# Patient Record
Sex: FEMALE | Race: BLACK OR AFRICAN AMERICAN | NOT HISPANIC OR LATINO | URBAN - METROPOLITAN AREA
[De-identification: names, ages, dates, MRNs, and addresses within clinical notes are randomized per-mention and may not be internally consistent; named-entity substitution may affect disease eponyms.]

---

## 2017-05-17 ENCOUNTER — TELEPHONE (OUTPATIENT)
Dept: FAMILY MEDICINE | Facility: CLINIC | Age: 32
End: 2017-05-17

## 2017-05-17 ENCOUNTER — OFFICE VISIT (OUTPATIENT)
Dept: FAMILY MEDICINE | Facility: CLINIC | Age: 32
End: 2017-05-17

## 2017-05-17 VITALS
RESPIRATION RATE: 18 BRPM | BODY MASS INDEX: 22.46 KG/M2 | HEART RATE: 74 BPM | SYSTOLIC BLOOD PRESSURE: 98 MMHG | WEIGHT: 115 LBS | TEMPERATURE: 98.8 F | OXYGEN SATURATION: 97 % | DIASTOLIC BLOOD PRESSURE: 66 MMHG

## 2017-05-17 DIAGNOSIS — Z32.01 PREGNANCY TEST POSITIVE: Primary | ICD-10-CM

## 2017-05-17 LAB — HCG UR QL: POSITIVE

## 2017-05-17 ASSESSMENT — ENCOUNTER SYMPTOMS
NAUSEA: 0
FEVER: 0
VOMITING: 0
CHILLS: 0
DYSURIA: 0
SHORTNESS OF BREATH: 0
COUGH: 0
DIFFICULTY URINATING: 0
DIARRHEA: 0

## 2017-05-17 NOTE — PROGRESS NOTES
Preceptor Attestation:   Patient seen and discussed with the resident.   Assessment and plan reviewed with resident and agreed upon.   Supervising Physician:  Seven Bell MD  [facility]

## 2017-05-17 NOTE — MR AVS SNAPSHOT
After Visit Summary   2017    Uday Stauffer    MRN: [id number]           Patient Information     Date Of Birth          1985        Visit Information        Provider Department      2017 4:00 PM Seven Bell MD [facility]        Today's Diagnoses     Pregnancy test positive    -  1       Follow-ups after your visit        Who to contact     Please call your clinic at [phone] to:    Ask questions about your health    Make or cancel appointments    Discuss your medicines    Learn about your test results    Speak to your doctor   If you have compliments or concerns about an experience at your clinic, or if you wish to file a complaint, please contact [facility] Patient Relations at [phone] or email us at [email or web address]         Additional Information About Your Visit        MyChart Information     [employer] is an electronic gateway that provides easy, online access to your medical records. With [employer], you can request a clinic appointment, read your test results, renew a prescription or communicate with your care team.     To sign up for One Diaryt visit the website at [email or web address]   You will be asked to enter the access code listed below, as well as some personal information. Please follow the directions to create your username and password.     Your access code is: [email or web address]  Expires: 8/15/2017  4:51 PM     Your access code will  in 90 days. If you need help or a new code, please contact your [facility] or call [phone] for assistance.        Care EveryWhere ID     This is your Care EveryWhere ID. This could be used by other organizations to access your [place] medical records  [id number]        Your Vitals Were     Pulse Temperature Respirations Last Period Pulse Oximetry Breastfeeding?    74 98.8  F (37.1  C) (Oral) 18 2017 97% No    BMI (Body  Mass Index)                   22.46 kg/m2            Blood Pressure from Last 3 Encounters:   05/17/17 98/66   11/25/16 96/65   10/27/16 128/83    Weight from Last 3 Encounters:   05/17/17 115 lb (52.2 kg)   11/25/16 117 lb (53.1 kg)   10/27/16 111 lb (50.3 kg)              We Performed the Following     HCG Qualitative Urine (UPT) ([facility])          Today's Medication Changes          These changes are accurate as of: 5/17/17  4:51 PM.  If you have any questions, ask your nurse or doctor.               Stop taking these medicines if you haven't already. Please contact your care team if you have questions.     ibuprofen 800 MG tablet   Commonly known as:  ADVIL/MOTRIN   Stopped by:  Seven Bell MD           levonorgestrel 1.5 MG Tabs tablet   Commonly known as:  PLAN B   Stopped by:  Seven Bell MD                    Primary Care Provider Office Phone # Fax #    Ziyad Morton,  [phone]       [facility] [address] [facility] [address]        Thank you!     Thank you for choosing [facility]  for your care. Our goal is always to provide you with excellent care. Hearing back from our patients is one way we can continue to improve our services. Please take a few minutes to complete the written survey that you may receive in the mail after your visit with us. Thank you!             Your Updated Medication List - Protect others around you: Learn how to safely use, store and throw away your medicines at www.disposemymeds.org.          This list is accurate as of: 5/17/17  4:51 PM.  Always use your most recent med list.                   Brand Name Dispense Instructions for use    cetirizine 10 MG tablet    zyrTEC    30 tablet    Take 1 tablet (10 mg) by mouth every evening       eucerin cream     200 g    Apply topically as needed for dry skin       lanolin ointment     28 g    Apply topically daily as needed for dry skin or itchy skin        polyethylene glycol powder    MIRALAX    510 g    Take 17 g (1 capful) by mouth daily       triamcinolone 0.1 % ointment    KENALOG    45 g    Apply to affected areas of rash on neck and back twice daily. Use as instructed.

## 2017-05-17 NOTE — TELEPHONE ENCOUNTER
[facility] phone call message: OB Request    Patient calling to start OB care. Has pregnancy been confirmed at a healthcare facility? No, patient has not taken a pregnancy test. Patient scheduled for confirmation of pregnancy visit in clinic.

## 2017-05-17 NOTE — PROGRESS NOTES
HPI:       Uday Stauffer is a 32 year old who presents for the following  Patient presents with:  Pregnancy Test    She states that she normally gets her period on time, and she was a week late. On the 30th she had some spotting. last menstrual period was April 4th, 2017.  She has 1 child, and he is 13 years old.     Problem, Medication and Allergy Lists were reviewed and are current.  Patient is an established patient of this clinic.         Review of Systems:   Review of Systems   Constitutional: Negative for chills and fever.   Eyes: Negative for visual disturbance.   Respiratory: Negative for cough and shortness of breath.    Cardiovascular: Negative for chest pain.   Gastrointestinal: Negative for diarrhea, nausea and vomiting.   Genitourinary: Negative for difficulty urinating, dysuria and vaginal discharge.        Missed period              Physical Exam:   Patient Vitals for the past 24 hrs:   BP Temp Temp src Pulse Resp SpO2 Weight   05/17/17 1616 98/66 98.8  F (37.1  C) Oral 74 18 97 % 115 lb (52.2 kg)     Body mass index is 22.46 kg/(m^2).  Vitals were reviewed and were normal     Physical Exam   Constitutional: She is oriented to person, place, and time. She appears well-developed and well-nourished. No distress.   HENT:   Head: Normocephalic and atraumatic.   Right Ear: External ear normal.   Left Ear: External ear normal.   Eyes: Conjunctivae are normal.   Neck: Normal range of motion.   Abdominal: Soft. She exhibits no distension. There is no tenderness. There is no rebound and no guarding.   Neurological: She is alert and oriented to person, place, and time. Gait normal.   Skin: Skin is warm. No rash noted. She is not diaphoretic.   Psychiatric: She has a normal mood and affect. Her behavior is normal. Thought content normal.         Results:     Results for orders placed or performed in visit on 05/17/17   HCG Qualitative Urine (UPT) ([facility])   Result Value Ref Range    HCG Qual Urine  POSITIVE Negative     Assessment and Plan    at 6weeks based on last menstrual period who presents for confirmation of pregnancy after a missed period. UPT positive today, patient is planning on keeping pregnancy but unsure if she will continue her prenatal care at [facility]. She will call back to schedule an appointment. Recommended prenatal vitamins which she prefers to get over the counter. Advised to avoid taking OTC medications without discussing with a provider, tylenol ok for pain or fever.   Diagnoses and all orders for this visit:    Pregnancy test positive  -     HCG Qualitative Urine (UPT) ([facility])          Medications Discontinued During This Encounter   Medication Reason     levonorgestrel (PLAN B) 1.5 MG TABS      ibuprofen (ADVIL,MOTRIN) 800 MG tablet      Options for treatment and follow-up care were reviewed with the patient. Uday Stauffer  engaged in the decision making process and verbalized understanding of the options discussed and agreed with the final plan.    Seven Bell MD

## 2017-10-09 ENCOUNTER — OFFICE VISIT (OUTPATIENT)
Dept: FAMILY MEDICINE | Facility: CLINIC | Age: 32
End: 2017-10-09

## 2017-10-09 VITALS
DIASTOLIC BLOOD PRESSURE: 75 MMHG | OXYGEN SATURATION: 100 % | BODY MASS INDEX: 22.26 KG/M2 | TEMPERATURE: 98.3 F | RESPIRATION RATE: 16 BRPM | HEART RATE: 63 BPM | SYSTOLIC BLOOD PRESSURE: 111 MMHG | WEIGHT: 114 LBS

## 2017-10-09 DIAGNOSIS — L30.9 DERMATITIS: ICD-10-CM

## 2017-10-09 DIAGNOSIS — Z32.00 PREGNANCY EXAMINATION OR TEST, PREGNANCY UNCONFIRMED: Primary | ICD-10-CM

## 2017-10-09 LAB — HCG UR QL: POSITIVE

## 2017-10-09 RX ORDER — TRIAMCINOLONE ACETONIDE 1 MG/G
OINTMENT TOPICAL
Qty: 45 G | Refills: 3 | Status: SHIPPED | OUTPATIENT
Start: 2017-10-09 | End: 2018-02-01

## 2017-10-09 ASSESSMENT — ENCOUNTER SYMPTOMS
DIFFICULTY URINATING: 0
NEUROLOGICAL NEGATIVE: 1
BLOOD IN STOOL: 0
HEMATOLOGIC/LYMPHATIC NEGATIVE: 1
TROUBLE SWALLOWING: 0
NAUSEA: 0
FEVER: 0
SHORTNESS OF BREATH: 0
CHILLS: 0
VOMITING: 0
DIARRHEA: 0
RHINORRHEA: 0
ABDOMINAL PAIN: 1
CONSTIPATION: 0

## 2017-10-09 NOTE — PROGRESS NOTES
Preceptor Attestation:   Patient seen and discussed with the resident. Assessment and plan reviewed with resident and agreed upon.   Supervising Physician:  Job Patiño MD  [facility]

## 2017-10-09 NOTE — PROGRESS NOTES
HPI:       Uday Stauffer is a 32 year old who presents for the following:    Pregnancy test: Urine HCG test confirms home pregnancy test as positive. The pregnancy was unplanned but not bad news. She would like to discuss the situation with the father. She believes LMP was first week of September, putting the pregnancy at ~4wks. She describes 3 previous pregnancies, however previous note says  (now ). Of those she remembers, pregnancy #1 led to spontaneous  delivery of now 13-year-old son, #2 led to ~9wk miscarriage this spring, and #3 is current. She denies medical problems during her pregnancies, current medical problems, or current prescription medications. Family history is positive for hypertension and diabetes mellitus.    Shoulder: describes right shoulder pain for two weeks after a lifting injury at work. She did not fall, just felt her shoulder give a little with pain. Pain limits her active ROM, however there is no muscular weakness. Pain is referred along the lateral arm with motion. She has taken Excedrin   Migraine and possibly ibuprofen for this and other pains, which is a concern during pregnancy.    Problem, Medication and Allergy Lists were reviewed and are current.  Patient is an established patient of this clinic.         Review of Systems:   Review of Systems   Constitutional: Negative for chills and fever.   HENT: Negative for ear pain, rhinorrhea and trouble swallowing.    Eyes: Negative for visual disturbance.   Respiratory: Negative for shortness of breath.    Cardiovascular: Negative for chest pain.   Gastrointestinal: Positive for abdominal pain. Negative for blood in stool, constipation, diarrhea, nausea and vomiting.   Genitourinary: Negative for difficulty urinating.   Skin: Negative.    Neurological: Negative.    Hematological: Negative.              Physical Exam:   Patient Vitals for the past 24 hrs:   BP Temp Temp src Pulse Resp SpO2 Weight   10/09/17 1619  111/75 98.3  F (36.8  C) Oral 63 16 100 % 114 lb (51.7 kg)     Body mass index is 22.26 kg/(m^2).  Vitals were reviewed and were normal     Physical Exam   Constitutional: She is oriented to person, place, and time. She appears well-developed and well-nourished. No distress.   HENT:   Head: Normocephalic and atraumatic.   Eyes: EOM are normal. Right eye exhibits no discharge. Left eye exhibits no discharge.   Neck: Normal range of motion. Neck supple.   Cardiovascular: Normal rate and regular rhythm.    No murmur heard.  Pulmonary/Chest: Effort normal. No respiratory distress. She has no wheezes. She has no rales.   Abdominal: Soft. There is no tenderness.   Musculoskeletal: Normal range of motion. She exhibits no edema.   Neurological: She is alert and oriented to person, place, and time. Coordination normal.   Skin: Skin is warm and dry. No rash noted. She is not diaphoretic.   Psychiatric: She has a normal mood and affect. Her behavior is normal.     MSK: no visible shoulder deformity, not tender to palpation   Passive ROM intact   Active ROM limited laterally by pain above shoulder height, otherwise intact   Both thumbs up back to mid-scapula equally   No weakness on int-/external rotation, abd-/adduction, flex-/extension   Empty can test negative      Results:      Results from the last 24 hours  Results for orders placed or performed in visit on 10/09/17 (from the past 24 hour(s))   HCG Qualitative Urine (UPT) ([facility])   Result Value Ref Range    HCG Qual Urine POSITIVE Negative     Assessment and Plan     # pregnancy, approx 4wks gestation  Unplanned, will discuss options with father.  Discussed options for pregnancy including continuation, adoption, .  She was non-committal regarding what she was considering, stated she wanted to discuss with partner and discuss further with her PCP Dr. Morton who she has good rapport with.   - follow up w/ PCP Dr. Morton   - scheduled dating ultrasound for after  8wks (november)   - counseled to start prenatal vitamin (declined prescription)   - counseled to avoid aspirin and ibuprofen    # right shoulder pain   - discussed symptomatic management with ice/heat/rest     There are no discontinued medications.  Options for treatment and follow-up care were reviewed with the patient. Uday Stauffer  engaged in the decision making process and verbalized understanding of the options discussed and agreed with the final plan.    This note scribed by Bruce Hartman MS4 for Alonso Mckee MD    The medical student acted as scribe and the encounter documented above was completely performed by myself and the documentation reflects the work I have performed today. Alonso Mckee MD

## 2017-10-09 NOTE — PATIENT INSTRUCTIONS
Here is the plan from today's visit    1. Pregnancy examination or test, pregnancy unconfirmed  - HCG Qualitative Urine (UPT) ([facility])  -  OB <14 WKS SINGLE OR FIRST GESTATION (IN CLINIC); Future: After 11/8/17  - Recommend daily prenatal vitamin    2. Dermatitis  - triamcinolone (KENALOG) 0.1 % ointment; Apply to affected areas of rash on neck and back twice daily. Use as instructed.  Dispense: 45 g; Refill: 3    Please call or return to clinic if your symptoms don't go away.    Follow up plan  Please make a clinic appointment for follow up with your primary physician Ziyad Morton after dating ultrasound for first OB.    Thank you for coming to [facility] today.  Lab Testing:  **If you had lab testing today and your results are reassuring or normal they will be mailed to you or sent through Limundo within 7 days.   **If the lab tests need quick action we will call you with the results.  The phone number we will call with results is # [phone] (home) . If this is not the best number please call our clinic and change the number.  Medication Refills:  If you need any refills please call your pharmacy and they will contact us.   If you need to  your refill at a new pharmacy, please contact the new pharmacy directly. The new pharmacy will help you get your medications transferred faster.   Scheduling:  If you have any concerns about today's visit or wish to schedule another appointment please call our office during normal business hours [phone] (8-5:00 M-F)  If a referral was made to a [facility] and you don't get a call from central scheduling please call [phone].  If a Mammogram was ordered for you at [facility] call [phone] to schedule or change your appointment.  If you had an XRay/CT/Ultrasound/MRI ordered the number is [phone] to schedule or change your radiology appointment.   Medical Concerns:  If you have urgent medical concerns  please call [phone] at any time of the day.

## 2017-10-09 NOTE — MR AVS SNAPSHOT
After Visit Summary   10/9/2017    Uday Stauffer    MRN: [id number]           Patient Information     Date Of Birth          1985        Visit Information        Provider Department      10/9/2017 4:20 PM Alonso Mckee MD [facility]        Today's Diagnoses     Pregnancy examination or test, pregnancy unconfirmed    -  1    Dermatitis          Care Instructions    Here is the plan from today's visit    1. Pregnancy examination or test, pregnancy unconfirmed  - HCG Qualitative Urine (UPT) ([facility])  -  OB <14 WKS SINGLE OR FIRST GESTATION (IN CLINIC); Future: After 11/8/17  - Recommend daily prenatal vitamin    2. Dermatitis  - triamcinolone (KENALOG) 0.1 % ointment; Apply to affected areas of rash on neck and back twice daily. Use as instructed.  Dispense: 45 g; Refill: 3    Please call or return to clinic if your symptoms don't go away.    Follow up plan  Please make a clinic appointment for follow up with your primary physician Ziyad Morton after dating ultrasound for first OB.    Thank you for coming to [facility] today.  Lab Testing:  **If you had lab testing today and your results are reassuring or normal they will be mailed to you or sent through Admatic within 7 days.   **If the lab tests need quick action we will call you with the results.  The phone number we will call with results is # [phone] (home) . If this is not the best number please call our clinic and change the number.  Medication Refills:  If you need any refills please call your pharmacy and they will contact us.   If you need to  your refill at a new pharmacy, please contact the new pharmacy directly. The new pharmacy will help you get your medications transferred faster.   Scheduling:  If you have any concerns about today's visit or wish to schedule another appointment please call our office during normal business hours [phone] (8-5:00 M-F)  If a referral was  made to a [facility] and you don't get a call from central scheduling please call [phone].  If a Mammogram was ordered for you at [facility] call [phone] to schedule or change your appointment.  If you had an XRay/CT/Ultrasound/MRI ordered the number is [phone] to schedule or change your radiology appointment.   Medical Concerns:  If you have urgent medical concerns please call [phone] at any time of the day.            Follow-ups after your visit        Future tests that were ordered for you today     Open Future Orders        Priority Expected Expires Ordered    US OB <14 WKS SINGLE OR FIRST GESTATION (IN CLINIC) Routine  10/9/2018 10/9/2017            Who to contact     Please call your clinic at [phone] to:    Ask questions about your health    Make or cancel appointments    Discuss your medicines    Learn about your test results    Speak to your doctor   If you have compliments or concerns about an experience at your clinic, or if you wish to file a complaint, please contact [facility] Patient Relations at [phone] or email us at [email or web address]         Additional Information About Your Visit        [employer] On[employer]and[employer] Information     [employer] is an electronic gateway that provides easy, online access to your medical records. With [employer], you can request a clinic appointment, read your test results, renew a prescription or communicate with your care team.     To sign up for Hurray!t visit the website at [email or web address]   You will be asked to enter the access code listed below, as well as some personal information. Please follow the directions to create your username and password.     Your access code is: [email or web address]  Expires: 2018  4:56 PM     Your access code will  in 90 days. If you need help or a new code, please contact your [facility] or call [phone] for  assistance.        Care EveryWhere ID     This is your Care EveryWhere ID. This could be used by other organizations to access your [place] medical records  [id number]        Your Vitals Were     Pulse Temperature Respirations Last Period Pulse Oximetry BMI (Body Mass Index)    63 98.3  F (36.8  C) (Oral) 16 09/04/2017 100% 22.26 kg/m2       Blood Pressure from Last 3 Encounters:   10/09/17 111/75   05/17/17 98/66   11/25/16 96/65    Weight from Last 3 Encounters:   10/09/17 114 lb (51.7 kg)   05/17/17 115 lb (52.2 kg)   11/25/16 117 lb (53.1 kg)              We Performed the Following     HCG Qualitative Urine (UPT) ([facility])          Where to get your medicines      These medications were sent to [facility] [address] [place], [place] [address] [address] [facility] & [place] [id number], [facility] [address]     Phone:  [phone]     triamcinolone 0.1 % ointment          Primary Care Provider Office Phone # Fax #    Ziyad Morton,  [phone]       [facility] [address] [facility] [address]        Equal Access to Services     TRACY VIRAMONTES AH: Hadii bruno stevens hadasho Soomaali, waaxda luqadaha, qaybta kaalmada adeegyada, luis angel ramires hayraya mirza. So [facility] [phone].    ATENCIÓN: Si habla español, tiene a he disposición servicios gratuitos de asistencia lingüística. Charline al [phone].    We comply with applicable federal civil rights laws and [place] laws. We do not discriminate on the basis of race, color, national origin, age, disability, sex, sexual orientation, or gender identity.            Thank you!     Thank you for choosing [facility]  for your care. Our goal is always to provide you with excellent care. Hearing back from our patients is one way we can continue to improve our services. Please take a few minutes to complete the written survey that you may receive in the mail  after your visit with us. Thank you!             Your Updated Medication List - Protect others around you: Learn how to safely use, store and throw away your medicines at www.disposemymeds.org.          This list is accurate as of: 10/9/17  4:56 PM.  Always use your most recent med list.                   Brand Name Dispense Instructions for use Diagnosis    cetirizine 10 MG tablet    zyrTEC    30 tablet    Take 1 tablet (10 mg) by mouth every evening    Seasonal allergies       eucerin cream     200 g    Apply topically as needed for dry skin    Irritant contact dermatitis       lanolin ointment     28 g    Apply topically daily as needed for dry skin or itchy skin    Itchy skin       polyethylene glycol powder    MIRALAX    510 g    Take 17 g (1 capful) by mouth daily    Constipation, unspecified constipation type       triamcinolone 0.1 % ointment    KENALOG    45 g    Apply to affected areas of rash on neck and back twice daily. Use as instructed.    Dermatitis

## 2017-10-11 ENCOUNTER — TELEPHONE (OUTPATIENT)
Dept: FAMILY MEDICINE | Facility: CLINIC | Age: 32
End: 2017-10-11

## 2017-10-11 NOTE — TELEPHONE ENCOUNTER
Received orders for an Ultrasound due after 11/9/17. Schedule is not open that far yet. I called the patient to let them know that and that I would call once the schdule opens up to get her scheduled. We got disconnected before I could say that. I called back and LVM with my name and callback number.     If patient calls back please relay message above. Thanks!

## 2017-10-16 ENCOUNTER — TELEPHONE (OUTPATIENT)
Dept: FAMILY MEDICINE | Facility: CLINIC | Age: 32
End: 2017-10-16

## 2017-10-16 NOTE — LETTER
October 27, 2017    Uday Stauffer  [address] [facility] [address]      Dear: Uday Stauffer    You were recently referred for an Ultrasound and New OB appointment by your primary care provider at [facility].  We have called twice to schedule this appointment, but have been unable to reach you.  If you are interested in receiving this service, please call [facility] at [phone].  We would be happy to schedule this appointment for you.      Thank you.      Sincerely,    Patient Representative    [facility]  Hours:  Monday-Friday 8:00 am - 5:00 pm   Phone Number: [phone]

## 2017-10-16 NOTE — TELEPHONE ENCOUNTER
Confirmation of pregnancy visit: 10/9/17    LMP: 9/4/17 (estimated)  Gestational Age: approx 5-6 weeks  Estimated Date of Delivery: TBD by US    Dating US has been ordered. Please call patient to schedule US after 11/1/17 (8 weeks gestation).     NOB may be scheduled after US is completed. May be scheduled same day, but US must be completed first.    When calling to schedule NOB, please give scheduling preference to the following providers:    Male:  1. Dr. Hayden  2. Dr. Franco    Female:  1. Dr. Morton  2. Dr. Corbin    Other Notes:  None    Please document call and close encounter.    Felicita Duran RN

## 2017-10-26 ENCOUNTER — TELEPHONE (OUTPATIENT)
Dept: FAMILY MEDICINE | Facility: CLINIC | Age: 32
End: 2017-10-26

## 2017-11-01 DIAGNOSIS — Z32.01 PREGNANCY EXAMINATION OR TEST, POSITIVE RESULT: Primary | ICD-10-CM

## 2017-11-01 NOTE — LETTER
2017      Uday Stauffer  [address] [facility] [address]        Dear Uday,    Thank you for getting your care at [facility]. Please see below for your test results.  Discussed in clinic, please schedule follow up OB appointment and ultrasound as instructed previously.    Resulted Orders   US OB < 14 Weeks w Transvaginal (IN CLINIC)    Narrative    1st Trimester Ultrasound Report   CONCLUSIONS: Viable IUP, there is a vascular questionable area seen adjacent to the fetal pole  EGA by this U/S: 7wks4d (+/- 5days): JET by this  U/S: 18              Follow up: Consider follow up US in 4 weeks to re-examine questionable area seen or   Patient:Uday Stauffer :  PCP:Ziyad Morton    Physician/Sonographer: Zoey Nicholas     Indications: Dating   LMP: Patient's last menstrual period was 2017.       Technique: Transabdominal and transvaginal  Machine: [employer] Pro 5    GESTATION & EMBRYO SURVEY:    Location of Pregnancy:Intrauterine fetal pole seen; there is a questionable vascular area seen adjacent to the fetal pole within the amnionic sac.  Cardiac activity: Present at 153 bpm  Yolk Sac: Normal            Rt Ovary : Normal; 2.4x1.7x3.1cm anechoic area seen  Lt Ovary : Normal; 2.9x2.2x2.9cm anechoic area seen with debris? Resolving cyst?    Measurements:                                 CRL:  1.32 cm 7 wks 4d       Zoey Nicholas, [facility] RVTper  Attestation of Reviewer.  I reviewed the images and agree with with interpretation above.  Mat Garcia MD           If you have any concerns about these results please call and leave a message for me or send a Compliance 360t message to the clinic.    Sincerely,    Alonso Mckee MD

## 2017-11-03 NOTE — PROGRESS NOTES
Results for orders placed or performed in visit on 17   US OB < 14 Weeks w Transvaginal (IN CLINIC)    Narrative    1st Trimester Ultrasound Report   CONCLUSIONS: Viable IUP, there is a vascular questionable area seen adjacent to the fetal pole  EGA by this U/S: 7wks4d (+/- 5days): JET by this  U/S: 18              Follow up: Consider follow up US in 4 weeks to re-examine questionable area seen or   Patient:Uday Stauffer :  PCP:Ziyad Morton    Physician/Sonographer: Zoey Nicholas     Indications: Dating   LMP: Patient's last menstrual period was 2017.       Technique: Transabdominal and transvaginal  Machine: [employer] Pro 5    GESTATION & EMBRYO SURVEY:    Location of Pregnancy:Intrauterine fetal pole seen; there is a questionable vascular area seen adjacent to the fetal pole within the amnionic sac.  Cardiac activity: Present at 153 bpm  Yolk Sac: Normal            Rt Ovary : Normal; 2.4x1.7x3.1cm anechoic area seen  Lt Ovary : Normal; 2.9x2.2x2.9cm anechoic area seen with debris? Resolving cyst?    Measurements:                                 CRL:  1.32 cm 7 wks 4d       Zoey Nicholas, [facility] Center[facility]  Attestation of Reviewer.  I reviewed the images and agree with with interpretation above.  Mat Garcia MD

## 2017-11-22 ENCOUNTER — TELEPHONE (OUTPATIENT)
Dept: FAMILY MEDICINE | Facility: CLINIC | Age: 32
End: 2017-11-22

## 2017-11-22 NOTE — TELEPHONE ENCOUNTER
"Patient has no showed 2 scheduled appointments. Please use following script to explain no show policy to patient:    \"We see that you have missed some of your recently scheduled appointments. At [facility] we have a new no show policy, where if you miss too many appointments within 1 year, we may not be able to continue to schedule you. If you are unable to make your scheduled appointments, please call the clinic to cancel prior to your appointment time.\"  "

## 2018-02-01 DIAGNOSIS — L30.9 DERMATITIS: ICD-10-CM

## 2018-02-01 RX ORDER — TRIAMCINOLONE ACETONIDE 1 MG/G
OINTMENT TOPICAL
Qty: 45 G | Refills: 3 | Status: SHIPPED | OUTPATIENT
Start: 2018-02-01 | End: 2021-07-16

## 2018-02-01 NOTE — TELEPHONE ENCOUNTER
Request for medication refill:    Date of last visit at clinic: 10/09/2017    Please complete refill if appropriate and CLOSE ENCOUNTER.    Closing the encounter signifies the refill is complete.    If refill has been denied, please complete the smart phrase .smirefuse and route it to the [facility] RN TRIAGE pool to inform the patient and the pharmacy.    Zohra Medellin, CMA

## 2018-04-16 ENCOUNTER — OFFICE VISIT (OUTPATIENT)
Dept: FAMILY MEDICINE | Facility: CLINIC | Age: 33
End: 2018-04-16
Payer: COMMERCIAL

## 2018-04-16 VITALS
WEIGHT: 124 LBS | OXYGEN SATURATION: 97 % | SYSTOLIC BLOOD PRESSURE: 112 MMHG | TEMPERATURE: 98.2 F | HEART RATE: 66 BPM | RESPIRATION RATE: 16 BRPM | DIASTOLIC BLOOD PRESSURE: 79 MMHG | BODY MASS INDEX: 24.22 KG/M2

## 2018-04-16 DIAGNOSIS — G43.109 MIGRAINE WITH AURA AND WITHOUT STATUS MIGRAINOSUS, NOT INTRACTABLE: ICD-10-CM

## 2018-04-16 DIAGNOSIS — G44.219 EPISODIC TENSION-TYPE HEADACHE, NOT INTRACTABLE: ICD-10-CM

## 2018-04-16 DIAGNOSIS — R10.84 ABDOMINAL PAIN, GENERALIZED: Primary | ICD-10-CM

## 2018-04-16 DIAGNOSIS — N89.8 VAGINAL DISCHARGE: ICD-10-CM

## 2018-04-16 LAB
% GRANULOCYTES: 66.3 %G (ref 40–75)
ALBUMIN SERPL-MCNC: 4.7 MG/DL (ref 3.8–5)
ALP SERPL-CCNC: 106.3 U/L (ref 31.7–110.5)
ALT SERPL-CCNC: 17.3 U/L (ref 0–45)
AST SERPL-CCNC: 9.1 U/L (ref 0–45)
BACTERIA: NORMAL
BILIRUB SERPL-MCNC: <0.4 MG/DL (ref 0.2–1.3)
BUN SERPL-MCNC: 6.8 MG/DL (ref 7–19)
CALCIUM SERPL-MCNC: 8.6 MG/DL (ref 8.5–10.1)
CHLORIDE SERPLBLD-SCNC: 103.4 MMOL/L (ref 98–110)
CLUE CELLS: NORMAL
CO2 SERPL-SCNC: 24.4 MMOL/L (ref 20–32)
CREAT SERPL-MCNC: 0.4 MG/DL (ref 0.5–1)
GFR SERPL CREATININE-BSD FRML MDRD: >90 ML/MIN/1.7 M2
GLUCOSE SERPL-MCNC: 101.1 MG'DL (ref 70–99)
GRANULOCYTES #: 6.5 K/UL (ref 1.6–8.3)
HCT VFR BLD AUTO: 43.9 % (ref 35–47)
HEMOGLOBIN: 13.8 G/DL (ref 11.7–15.7)
LYMPHOCYTES # BLD AUTO: 2.6 K/UL (ref 0.8–5.3)
LYMPHOCYTES NFR BLD AUTO: 26.4 %L (ref 20–48)
MCH RBC QN AUTO: 29.2 PG (ref 26.5–35)
MCHC RBC AUTO-ENTMCNC: 31.4 G/DL (ref 32–36)
MCV RBC AUTO: 92.8 FL (ref 78–100)
MID #: 0.7 K/UL (ref 0–2.2)
MID %: 7.3 %M (ref 0–20)
MOTILE TRICHOMONAS: NEGATIVE
ODOR: NORMAL
PH WET PREP: <4.5
PLATELET # BLD AUTO: 243 K/UL (ref 150–450)
POTASSIUM SERPL-SCNC: 3.3 MMOL/DL (ref 3.3–4.5)
PROT SERPL-MCNC: 6.8 G/DL (ref 6.8–8.8)
RBC # BLD AUTO: 4.73 M/UL (ref 3.8–5.2)
SODIUM SERPL-SCNC: 136.7 MMOL/L (ref 132.6–141.4)
WBC # BLD AUTO: 9.8 K/UL (ref 4–11)
WBC WET PREP: NORMAL
YEAST: NORMAL

## 2018-04-16 RX ORDER — SUMATRIPTAN 50 MG/1
50 TABLET, FILM COATED ORAL
Qty: 18 TABLET | Refills: 3 | Status: SHIPPED | OUTPATIENT
Start: 2018-04-16 | End: 2021-07-16

## 2018-04-16 RX ORDER — ACETAMINOPHEN 325 MG/1
650 TABLET ORAL EVERY 4 HOURS PRN
Qty: 100 TABLET | Refills: 0 | Status: SHIPPED | OUTPATIENT
Start: 2018-04-16 | End: 2020-08-04

## 2018-04-16 NOTE — LETTER
April 20, 2018      Uday Stauffer  [address] [facility] [address]        Dear Uday,    Thank you for getting your care at [facility]. Please see below for your test results.    Resulted Orders   Wet Prep ([facility])   Result Value Ref Range    Yeast Wet Prep None none    Motile Trichomonas Wet Prep Negative Negative    Clue Cells Wet Prep None NONE    WBC WET PREP None 2 - 5    Bacteria Wet Prep Moderate None    pH Wet Prep <4.5 3.8 - 4.5    Odor Wet Prep None NONE    Narrative    Self swab. MR   Anti Treponema   Result Value Ref Range    Treponema pallidum Antibody Negative NEG^Negative   HIV Antigen Antibody Combo   Result Value Ref Range    HIV Antigen Antibody Combo Nonreactive NR^Nonreactive          Comment:      HIV-1 p24 Ag & HIV-1/HIV-2 Ab Not Detected   CBC with Diff Plt (hospitals)   Result Value Ref Range    WBC 9.8 4.0 - 11.0 K/uL    Lymphocytes # 2.6 0.8 - 5.3 K/uL    % Lymphocytes 26.4 20.0 - 48.0 %L    Mid # 0.7 0.0 - 2.2 K/uL    Mid % 7.3 0.0 - 20.0 %M    GRANULOCYTES # 6.5 1.6 - 8.3 K/uL    % Granulocytes 66.3 40.0 - 75.0 %G    RBC 4.73 3.80 - 5.20 M/uL    Hemoglobin 13.8 11.7 - 15.7 g/dL    Hematocrit 43.9 35.0 - 47.0 %    MCV 92.8 78.0 - 100.0 fL    MCH 29.2 26.5 - 35.0 pg    MCHC 31.4 (L) 32.0 - 36.0 g/dL    Platelets 243.0 150.0 - 450.0 K/uL   Comprehensive Metabolic Panel (hospitals)   Result Value Ref Range    Albumin 4.7 3.8 - 5.0 mg/dL    Alkaline Phosphatase 106.3 31.7 - 110.5 U/L    ALT 17.3 0.0 - 45.0 U/L    AST 9.1 0.0 - 45.0 U/L    Bilirubin Total <0.4 0.2 - 1.3 mg/dL    Urea Nitrogen 6.8 (L) 7.0 - 19.0 mg/dL    Calcium 8.6 8.5 - 10.1 mg/dL    Chloride 103.4 98.0 - 110.0 mmol/L    Carbon Dioxide 24.4 20.0 - 32.0 mmol/L    Creatinine 0.4 (L) 0.5 - 1.0 mg/dL    Glucose 101.1 (H) 70.0 - 99.0 mg'dL    Potassium 3.3 3.3 - 4.5 mmol/dL    Sodium 136.7 132.6 - 141.4 mmol/L    Protein Total 6.8 6.8 - 8.8 g/dL    GFR Estimate >90 >60.0 mL/min/1.7 m2    GFR Estimate If Black >90  >60.0 mL/min/1.7 m2   Neisseria gonorrhoeae PCR   Result Value Ref Range    Specimen Descrip Urine     N Gonorrhea PCR Negative NEG^Negative      Comment:      Negative for N. gonorrhoeae rRNA by transcription mediated amplification.  A negative result by transcription mediated amplification does not preclude   the presence of N. gonorrhoeae infection because results are dependent on   proper and adequate collection, absence of inhibitors, and sufficient rRNA to   be detected.     Chlamydia trachomatis PCR   Result Value Ref Range    Specimen Description Urine     Chlamydia Trachomatis PCR Negative NEG^Negative      Comment:      Negative for C. trachomatis rRNA by transcription mediated amplification.  A negative result by transcription mediated amplification does not preclude   the presence of C. trachomatis infection because results are dependent on   proper and adequate collection, absence of inhibitors, and sufficient rRNA to   be detected.         Everything looks normal.  We did see a few bacteria in the vaginal swab but no infection.  No other illness found and your kidneys and liver are normal.  I would see how the next 2 weeks go and if you are not better please come back so we can order an ultrasound and talk about other tests we should do.    Sincerely,    Chet Mckenna MD

## 2018-04-16 NOTE — MR AVS SNAPSHOT
After Visit Summary   2018    Uday Stauffer    MRN: [id number]           Patient Information     Date Of Birth          1985        Visit Information        Provider Department      2018 2:40 PM Chet Mckenna MD [facility]        Today's Diagnoses     Abdominal pain, generalized    -  1    Vaginal discharge        Episodic tension-type headache, not intractable        Migraine with aura and without status migrainosus, not intractable           Follow-ups after your visit        Who to contact     Please call your clinic at [phone] to:    Ask questions about your health    Make or cancel appointments    Discuss your medicines    Learn about your test results    Speak to your doctor            Additional Information About Your Visit        MyChart Information     [employer] is an electronic gateway that provides easy, online access to your medical records. With [employer]Tap, you can request a clinic appointment, read your test results, renew a prescription or communicate with your care team.     To sign up for [employer] visit the website at [email or web address]   You will be asked to enter the access code listed below, as well as some personal information. Please follow the directions to create your username and password.     Your access code is: [email or web address]  Expires: 2018  8:15 AM     Your access code will  in 90 days. If you need help or a new code, please contact your [facility] or call [phone] for assistance.        Care EveryWhere ID     This is your Care EveryWhere ID. This could be used by other organizations to access your [place] medical records  [id number]        Your Vitals Were     Pulse Temperature Respirations Last Period Pulse Oximetry Breastfeeding?    66 98.2  F (36.8  C) (Oral) 16 03/15/2018 (Approximate) 97% No    BMI (Body Mass Index)                   24.22 kg/m2            Blood Pressure from  Last 3 Encounters:   04/16/18 112/79   10/09/17 111/75   05/17/17 98/66    Weight from Last 3 Encounters:   04/16/18 124 lb (56.2 kg)   10/09/17 114 lb (51.7 kg)   05/17/17 115 lb (52.2 kg)              We Performed the Following     Anti Treponema     CBC with Diff Plt ([facility])     Chlamydia trachomatis PCR     Comprehensive Metabolic Panel ([facility])     HIV Antigen Antibody Combo     Neisseria gonorrhoeae PCR     Wet Prep ([facility])          Today's Medication Changes          These changes are accurate as of 4/16/18 11:59 PM.  If you have any questions, ask your nurse or doctor.               Start taking these medicines.        Dose/Directions    acetaminophen 325 MG tablet   Commonly known as:  TYLENOL   Used for:  Episodic tension-type headache, not intractable   Started by:  Chet Mckenna MD        Dose:  650 mg   Take 2 tablets (650 mg) by mouth every 4 hours as needed for mild pain   Quantity:  100 tablet   Refills:  0       omeprazole 20 MG CR capsule   Commonly known as:  priLOSEC   Used for:  Abdominal pain, generalized   Started by:  Chet Mckenna MD        Dose:  20 mg   Take 1 capsule (20 mg) by mouth daily   Quantity:  30 capsule   Refills:  1       SUMAtriptan 50 MG tablet   Commonly known as:  IMITREX   Used for:  Migraine with aura and without status migrainosus, not intractable   Started by:  Chet Mckenna MD        Dose:  50 mg   Take 1 tablet (50 mg) by mouth at onset of headache for migraine May repeat in 2 hours. Max 8 tablets/24 hours.   Quantity:  18 tablet   Refills:  3            Where to get your medicines      These medications were sent to [facility] [id number] [place] [address] [address] [place] [address] [address] AT [address], [facility] [address]     Phone:  [phone]     acetaminophen 325 MG tablet    omeprazole 20 MG CR capsule    SUMAtriptan 50 MG tablet                Primary Care Provider Office Phone # Fax #    Ziyad  Arabellaalber Morton, [phone]       [facility] [address] [facility] [address]        Equal Access to Services     TRACY VIRAMONTES : Hadii aad ku haddaltono Sopingali, waaxda luqadaha, qaybta kaalmada adesandra, luis angel jensenregan thibodeauxjodie carmen percy mirza. So [facility] [phone].    ATENCIÓN: Si habla español, tiene a he disposición servicios gratuitos de asistencia lingüística. Llame al [phone].    We comply with applicable federal civil rights laws and [place] laws. We do not discriminate on the basis of race, color, national origin, age, disability, sex, sexual orientation, or gender identity.            Thank you!     Thank you for choosing [facility]  for your care. Our goal is always to provide you with excellent care. Hearing back from our patients is one way we can continue to improve our services. Please take a few minutes to complete the written survey that you may receive in the mail after your visit with us. Thank you!             Your Updated Medication List - Protect others around you: Learn how to safely use, store and throw away your medicines at www.disposemymeds.org.          This list is accurate as of 4/16/18 11:59 PM.  Always use your most recent med list.                   Brand Name Dispense Instructions for use Diagnosis    acetaminophen 325 MG tablet    TYLENOL    100 tablet    Take 2 tablets (650 mg) by mouth every 4 hours as needed for mild pain    Episodic tension-type headache, not intractable       cetirizine 10 MG tablet    zyrTEC    30 tablet    Take 1 tablet (10 mg) by mouth every evening    Seasonal allergies       eucerin cream     200 g    Apply topically as needed for dry skin    Irritant contact dermatitis       lanolin ointment     28 g    Apply topically daily as needed for dry skin or itchy skin    Itchy skin       omeprazole 20 MG CR capsule    priLOSEC    30 capsule    Take 1 capsule (20 mg) by mouth daily    Abdominal  pain, generalized       polyethylene glycol powder    MIRALAX    510 g    Take 17 g (1 capful) by mouth daily    Constipation, unspecified constipation type       SUMAtriptan 50 MG tablet    IMITREX    18 tablet    Take 1 tablet (50 mg) by mouth at onset of headache for migraine May repeat in 2 hours. Max 8 tablets/24 hours.    Migraine with aura and without status migrainosus, not intractable       triamcinolone 0.1 % ointment    KENALOG    45 g    Apply to affected areas of rash on neck and back twice daily. Use as instructed.    Dermatitis

## 2018-04-16 NOTE — PROGRESS NOTES
SUBJECTIVE:   Uday Stauffer is a 33 year old female who presents to clinic today for the following health issues:  1. Abdominal pain cramping  2. Stomach burning  3. Vaginal spotting and discharge    Stomach burning for 2 months.  Comes and goes.  Takes a lot of headache medicine like advil.  Poop looks black.  Not diarrhea.  Sometimes vomit blood.  Once a week.  Nothing tried.  Right sided abdominal cramping happens for 2 weeks.  Comes and goes--last 5 minutes.  No effect with food.    Vaginal spotting seems to go along with the cramping.  Not pregnant.  Last had sex since October with men.  Dates women now.    Bleeding feels liike would be heavy but very little bleeding there.  Smokes.    Has had appy in past per pt  Problem list and histories reviewed & adjusted, as indicated.  Additional history: as documented    Patient Active Problem List   Diagnosis     Contact dermatitis and other eczema, due to unspecified cause     Tobacco use disorder     Health Care Home     Alopecia     Pregnancy test positive     History reviewed. No pertinent surgical history.    Social History   Substance Use Topics     Smoking status: Current Some Day Smoker     Smokeless tobacco: Never Used     Alcohol use Yes      Comment: once a month     Family History   Problem Relation Age of Onset     DIABETES Father          Current Outpatient Prescriptions   Medication Sig Dispense Refill     omeprazole (PRILOSEC) 20 MG CR capsule Take 1 capsule (20 mg) by mouth daily 30 capsule 1     acetaminophen (TYLENOL) 325 MG tablet Take 2 tablets (650 mg) by mouth every 4 hours as needed for mild pain 100 tablet 0     SUMAtriptan (IMITREX) 50 MG tablet Take 1 tablet (50 mg) by mouth at onset of headache for migraine May repeat in 2 hours. Max 8 tablets/24 hours. 18 tablet 3     triamcinolone (KENALOG) 0.1 % ointment Apply to affected areas of rash on neck and back twice daily. Use as instructed. 45 g 3     lanolin ointment Apply topically daily  as needed for dry skin or itchy skin 28 g 0     polyethylene glycol (MIRALAX) powder Take 17 g (1 capful) by mouth daily (Patient not taking: Reported on 10/9/2017) 510 g 1     cetirizine (ZYRTEC) 10 MG tablet Take 1 tablet (10 mg) by mouth every evening (Patient not taking: Reported on 10/9/2017) 30 tablet 6     Skin Protectants, Misc. (EUCERIN) cream Apply topically as needed for dry skin (Patient not taking: Reported on 10/9/2017) 200 g 1     Allergies   Allergen Reactions     No Known Drug Allergies        Reviewed and updated as needed this visit by clinical staff  Tobacco  Allergies  Meds  Med Hx  Surg Hx  Fam Hx  Soc Hx      Reviewed and updated as needed this visit by Provider         ROS:  Constitutional, HEENT, cardiovascular, pulmonary, gi and gu systems are negative, except as otherwise noted.    OBJECTIVE:     /79  Pulse 66  Temp 98.2  F (36.8  C) (Oral)  Resp 16  Wt 124 lb (56.2 kg)  LMP 03/15/2018 (Approximate)  SpO2 97%  Breastfeeding? No  BMI 24.22 kg/m2  Body mass index is 24.22 kg/(m^2).  GENERAL: healthy, alert and no distress  RESP: lungs clear to auscultation - no rales, rhonchi or wheezes  CV: regular rate and rhythm, normal S1 S2, no S3 or S4, no murmur, click or rub, no peripheral edema and peripheral pulses strong  ABDOMEN: soft, there is some diffuse tenderness throughout but seems quite mild, a little worse in rlq, no hepatosplenomegaly, no masses and bowel sounds normal.  Previous vertical laparotomy scar noted.  MS: no gross musculoskeletal defects noted, no edema    Diagnostic Test Results:  Results for orders placed or performed in visit on 04/16/18 (from the past 24 hour(s))   CBC with Diff Plt ([facility])   Result Value Ref Range    WBC 9.8 4.0 - 11.0 K/uL    Lymphocytes # 2.6 0.8 - 5.3 K/uL    % Lymphocytes 26.4 20.0 - 48.0 %L    Mid # 0.7 0.0 - 2.2 K/uL    Mid % 7.3 0.0 - 20.0 %M    GRANULOCYTES # 6.5 1.6 - 8.3 K/uL    % Granulocytes 66.3 40.0 - 75.0 %G    RBC  4.73 3.80 - 5.20 M/uL    Hemoglobin 13.8 11.7 - 15.7 g/dL    Hematocrit 43.9 35.0 - 47.0 %    MCV 92.8 78.0 - 100.0 fL    MCH 29.2 26.5 - 35.0 pg    MCHC 31.4 (L) 32.0 - 36.0 g/dL    Platelets 243.0 150.0 - 450.0 K/uL   Comprehensive Metabolic Panel ([facility])   Result Value Ref Range    Albumin 4.7 3.8 - 5.0 mg/dL    Alkaline Phosphatase 106.3 31.7 - 110.5 U/L    ALT 17.3 0.0 - 45.0 U/L    AST 9.1 0.0 - 45.0 U/L    Bilirubin Total <0.4 0.2 - 1.3 mg/dL    Urea Nitrogen 6.8 (L) 7.0 - 19.0 mg/dL    Calcium 8.6 8.5 - 10.1 mg/dL    Chloride 103.4 98.0 - 110.0 mmol/L    Carbon Dioxide 24.4 20.0 - 32.0 mmol/L    Creatinine 0.4 (L) 0.5 - 1.0 mg/dL    Glucose 101.1 (H) 70.0 - 99.0 mg'dL    Potassium 3.3 3.3 - 4.5 mmol/dL    Sodium 136.7 132.6 - 141.4 mmol/L    Protein Total 6.8 6.8 - 8.8 g/dL    GFR Estimate >90 >60.0 mL/min/1.7 m2    GFR Estimate If Black >90 >60.0 mL/min/1.7 m2   Wet Prep ([facility])   Result Value Ref Range    Yeast Wet Prep None none    Motile Trichomonas Wet Prep Negative Negative    Clue Cells Wet Prep None NONE    WBC WET PREP None 2 - 5    Bacteria Wet Prep Moderate None    pH Wet Prep <4.5 3.8 - 4.5    Odor Wet Prep None NONE    Narrative    Self swab. MR       ASSESSMENT/PLAN:       ICD-10-CM    1. Abdominal pain, generalized R10.84 Wet Prep ([facility])     Anti Treponema     HIV Antigen Antibody Combo     CBC with Diff Plt ([facility])     Comprehensive Metabolic Panel ([facility])     omeprazole (PRILOSEC) 20 MG CR capsule     Neisseria gonorrhoeae PCR     Chlamydia trachomatis PCR     CANCELED: Neisseria gonorrhoeae PCR     CANCELED: Chlamydia trachomatis PCR   2. Vaginal discharge N89.8 Wet Prep ([facility])     Anti Treponema     HIV Antigen Antibody Combo     CBC with Diff Plt ([facility])     Comprehensive Metabolic Panel ([facility])     Neisseria gonorrhoeae PCR     Chlamydia trachomatis PCR     CANCELED: Neisseria gonorrhoeae PCR     CANCELED: Chlamydia trachomatis PCR   3. Episodic tension-type headache,  not intractable G44.219 acetaminophen (TYLENOL) 325 MG tablet   4. Migraine with aura and without status migrainosus, not intractable G43.109 SUMAtriptan (IMITREX) 50 MG tablet       Seems to have two likely causes--one is likely gastritis although I really can't find epigastric pain right now but history sounds compelling.  Urged pt to avoid Ibuprofen and aspirin and to use omeprazole for a month or two.  The vaginal discharge and lower discomfort is unclear.  Could be STI, will await GC/Chl.  Could be simple dysmenorrhea.  Could be other issue like fibroids or other intra abdominal pathology.  Sounds very connected to her menstaul period.  Will consider ultrasound if not improving.  >25 minutes spent with pt with >50% in counseling and coordination of care.    MD [facility]

## 2018-04-17 LAB
C TRACH DNA SPEC QL NAA+PROBE: NEGATIVE
HIV 1+2 AB+HIV1 P24 AG SERPL QL IA: NONREACTIVE
N GONORRHOEA DNA SPEC QL NAA+PROBE: NEGATIVE
SPECIMEN SOURCE: NORMAL
SPECIMEN SOURCE: NORMAL
T PALLIDUM IGG+IGM SER QL: NEGATIVE

## 2018-05-02 ENCOUNTER — TELEPHONE (OUTPATIENT)
Dept: FAMILY MEDICINE | Facility: CLINIC | Age: 33
End: 2018-05-02

## 2018-05-02 NOTE — TELEPHONE ENCOUNTER
[facility] phone call message- patient requesting results:    Test: Lab    Date of test: 4/16/18     Additional Comments: Patient is calling regarding her results. Author did read results letter but thought she was supposed to get some medication. Pharmacy is [facility].    OK to leave a message on voice mail? Yes    Primary language: English      needed? No    Call taken on May 2, 2018 at 4:06 PM by Iwona Mendiola

## 2018-05-02 NOTE — TELEPHONE ENCOUNTER
Returned call to patient.   Explained that omeprazole was ordered on her last visit.  She said she just picked up all of her medications but didn't know which one was for the stomach.  She has medications in her purse.   Instructed to locate medication,  Verified spelling and dosage,  Instructed to take daily.   Encouraged to call with questions.    Yamileth Elizalde RN

## 2018-05-21 ENCOUNTER — OFFICE VISIT (OUTPATIENT)
Dept: FAMILY MEDICINE | Facility: CLINIC | Age: 33
End: 2018-05-21
Payer: COMMERCIAL

## 2018-05-21 VITALS
BODY MASS INDEX: 23.83 KG/M2 | TEMPERATURE: 98.2 F | DIASTOLIC BLOOD PRESSURE: 80 MMHG | WEIGHT: 122 LBS | HEART RATE: 72 BPM | SYSTOLIC BLOOD PRESSURE: 130 MMHG | OXYGEN SATURATION: 100 %

## 2018-05-21 DIAGNOSIS — N92.6 IRREGULAR MENSTRUAL CYCLE: Primary | ICD-10-CM

## 2018-05-21 DIAGNOSIS — R10.2 PELVIC PAIN IN FEMALE: ICD-10-CM

## 2018-05-21 NOTE — MR AVS SNAPSHOT
After Visit Summary   2018    Uday Stauffer    MRN: [id number]           Patient Information     Date Of Birth          1985        Visit Information        Provider Department      2018 3:20 PM Chet Mckenna MD [facility]        Today's Diagnoses     Irregular menstrual cycle    -  1    Pelvic pain in female           Follow-ups after your visit        Future tests that were ordered for you today     Open Future Orders        Priority Expected Expires Ordered    US Pel W/Trans* Routine  2019            Who to contact     Please call your clinic at [phone] to:    Ask questions about your health    Make or cancel appointments    Discuss your medicines    Learn about your test results    Speak to your doctor            Additional Information About Your Visit        MyChart Information     [employer] is an electronic gateway that provides easy, online access to your medical records. With [employer], you can request a clinic appointment, read your test results, renew a prescription or communicate with your care team.     To sign up for [employer] visit the website at [email or web address]   You will be asked to enter the access code listed below, as well as some personal information. Please follow the directions to create your username and password.     Your access code is: [email or web address]  Expires: 2018  8:15 AM     Your access code will  in 90 days. If you need help or a new code, please contact your [facility] or call [phone] for assistance.        Care EveryWhere ID     This is your Care EveryWhere ID. This could be used by other organizations to access your [place] medical records  [id number]        Your Vitals Were     Pulse Temperature Last Period Pulse Oximetry BMI (Body Mass Index)       72 98.2  F (36.8  C) (Oral) 2018 100% 23.83 kg/m2        Blood Pressure from Last 3 Encounters:    05/21/18 130/80   04/16/18 112/79   10/09/17 111/75    Weight from Last 3 Encounters:   05/21/18 122 lb (55.3 kg)   04/16/18 124 lb (56.2 kg)   10/09/17 114 lb (51.7 kg)               Primary Care Provider Office Phone # Fax #    Ziyad Morton, [phone]       [facility] [address] [facility] [address]        Equal Access to Services     TRACY VIRAMONTES : Hadii aad ku hadasho Soomaali, waaxda luqadaha, qaybta kaalmada adeegyada, waxay idiin hayaan adeeg nella greer . So [facility] [phone].    ATENCIÓN: Si habla español, tiene a he disposición servicios gratuitos de asistencia lingüística. Charline al [phone].    We comply with applicable federal civil rights laws and [place] laws. We do not discriminate on the basis of race, color, national origin, age, disability, sex, sexual orientation, or gender identity.            Thank you!     Thank you for choosing [facility]  for your care. Our goal is always to provide you with excellent care. Hearing back from our patients is one way we can continue to improve our services. Please take a few minutes to complete the written survey that you may receive in the mail after your visit with us. Thank you!             Your Updated Medication List - Protect others around you: Learn how to safely use, store and throw away your medicines at www.disposemymeds.org.          This list is accurate as of 5/21/18  4:08 PM.  Always use your most recent med list.                   Brand Name Dispense Instructions for use Diagnosis    acetaminophen 325 MG tablet    TYLENOL    100 tablet    Take 2 tablets (650 mg) by mouth every 4 hours as needed for mild pain    Episodic tension-type headache, not intractable       cetirizine 10 MG tablet    zyrTEC    30 tablet    Take 1 tablet (10 mg) by mouth every evening    Seasonal allergies       eucerin cream     200 g    Apply topically as needed for dry skin    Irritant  contact dermatitis       lanolin ointment     28 g    Apply topically daily as needed for dry skin or itchy skin    Itchy skin       omeprazole 20 MG CR capsule    priLOSEC    30 capsule    Take 1 capsule (20 mg) by mouth daily    Abdominal pain, generalized       polyethylene glycol powder    MIRALAX    510 g    Take 17 g (1 capful) by mouth daily    Constipation, unspecified constipation type       SUMAtriptan 50 MG tablet    IMITREX    18 tablet    Take 1 tablet (50 mg) by mouth at onset of headache for migraine May repeat in 2 hours. Max 8 tablets/24 hours.    Migraine with aura and without status migrainosus, not intractable       triamcinolone 0.1 % ointment    KENALOG    45 g    Apply to affected areas of rash on neck and back twice daily. Use as instructed.    Dermatitis

## 2018-05-22 NOTE — PROGRESS NOTES
SUBJECTIVE:   Uday Stauffer is a 33 year old female who presents to clinic today for the following health issues:  1. Pelvic pain/menstral issues--pt returns stating that she continues to have crampy pelvic pain.  She came in a month ago.  No gc/chl found.  She has a female partner over last 6 months so she is confident she is not pregnant.  Did have aborted pregnancy last year.  Says she has had menstruation for 3 out of the last 4 weeks.  Wonders if she could be starting menopause early.  Bleeding not very heavy but persistent.  This is new for pt.  No weakness or dizziness but it is uncomfortable when crampy--says it feels like 'something want to get out of there.'  No fever, back pain, discharge, dysuria, urinary frequency or or urinary urgency.  No constipation symptoms--regular bm's daily without hard appearance.  No blood in stool.  2. F/u upper abdominal pain.  Patient states with the omeprazole, decreasing spicy food, decreasing aspirin and ibuprofen, that her upper abdominal pain/burning has resolved.  Lab workup was normal last time as well.    Problem list and histories reviewed & adjusted, as indicated.  Additional history: as documented    Patient Active Problem List   Diagnosis     Contact dermatitis and other eczema, due to unspecified cause     Tobacco use disorder     Health Care Home     Alopecia     Pregnancy test positive     No past surgical history on file.    Social History   Substance Use Topics     Smoking status: Current Some Day Smoker     Smokeless tobacco: Never Used     Alcohol use Yes      Comment: once a month     Family History   Problem Relation Age of Onset     DIABETES Father          Current Outpatient Prescriptions   Medication Sig Dispense Refill     acetaminophen (TYLENOL) 325 MG tablet Take 2 tablets (650 mg) by mouth every 4 hours as needed for mild pain 100 tablet 0     lanolin ointment Apply topically daily as needed for dry skin or itchy skin 28 g 0     omeprazole  (PRILOSEC) 20 MG CR capsule Take 1 capsule (20 mg) by mouth daily 30 capsule 1     SUMAtriptan (IMITREX) 50 MG tablet Take 1 tablet (50 mg) by mouth at onset of headache for migraine May repeat in 2 hours. Max 8 tablets/24 hours. 18 tablet 3     triamcinolone (KENALOG) 0.1 % ointment Apply to affected areas of rash on neck and back twice daily. Use as instructed. 45 g 3     cetirizine (ZYRTEC) 10 MG tablet Take 1 tablet (10 mg) by mouth every evening (Patient not taking: Reported on 10/9/2017) 30 tablet 6     polyethylene glycol (MIRALAX) powder Take 17 g (1 capful) by mouth daily (Patient not taking: Reported on 10/9/2017) 510 g 1     Skin Protectants, Misc. (EUCERIN) cream Apply topically as needed for dry skin (Patient not taking: Reported on 10/9/2017) 200 g 1     Allergies   Allergen Reactions     No Known Drug Allergies        Reviewed and updated as needed this visit by clinical staff  Tobacco  Allergies  Meds       Reviewed and updated as needed this visit by Provider         ROS:  Constitutional, HEENT, cardiovascular, pulmonary, gi and gu systems are negative, except as otherwise noted.    OBJECTIVE:     /80  Pulse 72  Temp 98.2  F (36.8  C) (Oral)  Wt 122 lb (55.3 kg)  LMP 05/16/2018  SpO2 100%  BMI 23.83 kg/m2  Body mass index is 23.83 kg/(m^2).  GENERAL: healthy, alert and no distress  RESP: lungs clear to auscultation - no rales, rhonchi or wheezes  CV: regular rate and rhythm, normal S1 S2, no S3 or S4, no murmur, click or rub, no peripheral edema and peripheral pulses strong  ABDOMEN: soft, nontender, no hepatosplenomegaly, no masses and bowel sounds normal--slight tenderness right over sigmoid colon area.  MS: no gross musculoskeletal defects noted, no edema    Diagnostic Test Results:  none     ASSESSMENT/PLAN:       ICD-10-CM    1. Irregular menstrual cycle N92.6 US Pel W/Trans*   2. Pelvic pain in female R10.2 US Pel W/Trans*       Will go ahead with pelvic ultrsound.  If  unrevealing will do broader sweep including urine, CRP , abdominal x-ray.    >25 minutes spent with pt with >50% in counseling and coordination of care.    Chet Mckenna MD  [facility]

## 2018-06-01 ENCOUNTER — HOSPITAL ENCOUNTER (OUTPATIENT)
Dept: ULTRASOUND IMAGING | Facility: CLINIC | Age: 33
Discharge: HOME | End: 2018-06-01
Attending: FAMILY MEDICINE | Admitting: FAMILY MEDICINE
Payer: COMMERCIAL

## 2018-06-01 DIAGNOSIS — R10.2 PELVIC PAIN IN FEMALE: ICD-10-CM

## 2018-06-01 DIAGNOSIS — N92.6 IRREGULAR MENSTRUAL CYCLE: ICD-10-CM

## 2018-06-01 PROCEDURE — 76856 US EXAM PELVIC COMPLETE: CPT

## 2020-08-04 ENCOUNTER — OFFICE VISIT (OUTPATIENT)
Dept: FAMILY MEDICINE | Facility: CLINIC | Age: 35
End: 2020-08-04
Payer: COMMERCIAL

## 2020-08-04 VITALS
HEART RATE: 69 BPM | RESPIRATION RATE: 16 BRPM | WEIGHT: 123.6 LBS | BODY MASS INDEX: 24.14 KG/M2 | SYSTOLIC BLOOD PRESSURE: 127 MMHG | DIASTOLIC BLOOD PRESSURE: 87 MMHG | OXYGEN SATURATION: 98 % | TEMPERATURE: 98.6 F

## 2020-08-04 DIAGNOSIS — R53.83 OTHER FATIGUE: ICD-10-CM

## 2020-08-04 DIAGNOSIS — Z30.018 ENCOUNTER FOR INITIAL PRESCRIPTION OF OTHER CONTRACEPTIVES: ICD-10-CM

## 2020-08-04 DIAGNOSIS — R10.13 DYSPEPSIA: Primary | ICD-10-CM

## 2020-08-04 DIAGNOSIS — Z30.09 GENERAL COUNSELLING AND ADVICE ON CONTRACEPTION: ICD-10-CM

## 2020-08-04 LAB
ALBUMIN SERPL-MCNC: 4.2 G/DL (ref 3.4–5)
ALP SERPL-CCNC: 86 U/L (ref 40–150)
ALT SERPL W P-5'-P-CCNC: 23 U/L (ref 0–50)
ANION GAP SERPL CALCULATED.3IONS-SCNC: 6 MMOL/L (ref 3–14)
AST SERPL W P-5'-P-CCNC: 15 U/L (ref 0–45)
BASOPHILS # BLD AUTO: 0 10E9/L (ref 0–0.2)
BASOPHILS NFR BLD AUTO: 0.2 %
BILIRUB SERPL-MCNC: 0.4 MG/DL (ref 0.2–1.3)
BUN SERPL-MCNC: 12 MG/DL (ref 7–30)
CALCIUM SERPL-MCNC: 9.2 MG/DL (ref 8.5–10.1)
CHLORIDE SERPL-SCNC: 103 MMOL/L (ref 94–109)
CO2 SERPL-SCNC: 26 MMOL/L (ref 20–32)
CREAT SERPL-MCNC: 0.51 MG/DL (ref 0.52–1.04)
DIFFERENTIAL METHOD BLD: ABNORMAL
EOSINOPHIL # BLD AUTO: 0.1 10E9/L (ref 0–0.7)
EOSINOPHIL NFR BLD AUTO: 0.6 %
ERYTHROCYTE [DISTWIDTH] IN BLOOD BY AUTOMATED COUNT: 13.2 % (ref 10–15)
GFR SERPL CREATININE-BSD FRML MDRD: >90 ML/MIN/{1.73_M2}
GLUCOSE SERPL-MCNC: 89 MG/DL (ref 70–99)
HCG UR QL: NEGATIVE
HCT VFR BLD AUTO: 45.6 % (ref 35–47)
HGB BLD-MCNC: 14.6 G/DL (ref 11.7–15.7)
IMM GRANULOCYTES # BLD: 0 10E9/L (ref 0–0.4)
IMM GRANULOCYTES NFR BLD: 0.3 %
LYMPHOCYTES # BLD AUTO: 2.6 10E9/L (ref 0.8–5.3)
LYMPHOCYTES NFR BLD AUTO: 21.6 %
MCH RBC QN AUTO: 29.3 PG (ref 26.5–33)
MCHC RBC AUTO-ENTMCNC: 32 G/DL (ref 31.5–36.5)
MCV RBC AUTO: 91 FL (ref 78–100)
MONOCYTES # BLD AUTO: 1 10E9/L (ref 0–1.3)
MONOCYTES NFR BLD AUTO: 8.3 %
NEUTROPHILS # BLD AUTO: 8.4 10E9/L (ref 1.6–8.3)
NEUTROPHILS NFR BLD AUTO: 69 %
NRBC # BLD AUTO: 0 10*3/UL
NRBC BLD AUTO-RTO: 0 /100
PLATELET # BLD AUTO: 234 10E9/L (ref 150–450)
POTASSIUM SERPL-SCNC: 3.1 MMOL/L (ref 3.4–5.3)
PROT SERPL-MCNC: 7.8 G/DL (ref 6.8–8.8)
RBC # BLD AUTO: 4.99 10E12/L (ref 3.8–5.2)
SODIUM SERPL-SCNC: 135 MMOL/L (ref 133–144)
WBC # BLD AUTO: 12.2 10E9/L (ref 4–11)

## 2020-08-04 NOTE — LETTER
August 5, 2020      Uday Stauffer  [address] [facility] [address]        Dear Uday,    Thank you for getting your care at [facility]. Please see below for your test results.  These results are reassuring.   Your white blood cell count is slightly elevated - which sometimes can indicate an infection, or inflammation. I am not concerned at these levels based on our discussions in clinic. Let's see what your stool tests show, and let us know if anything changes.    Resulted Orders   HCG Qualitative Urine (UPT) ([facility])   Result Value Ref Range    HCG Qual Urine NEGATIVE Negative   CBC with platelets differential   Result Value Ref Range    WBC 12.2 (H) 4.0 - 11.0 10e9/L    RBC Count 4.99 3.8 - 5.2 10e12/L    Hemoglobin 14.6 11.7 - 15.7 g/dL    Hematocrit 45.6 35.0 - 47.0 %    MCV 91 78 - 100 fl    MCH 29.3 26.5 - 33.0 pg    MCHC 32.0 31.5 - 36.5 g/dL    RDW 13.2 10.0 - 15.0 %    Platelet Count 234 150 - 450 10e9/L    Diff Method Automated Method     % Neutrophils 69.0 %    % Lymphocytes 21.6 %    % Monocytes 8.3 %    % Eosinophils 0.6 %    % Basophils 0.2 %    % Immature Granulocytes 0.3 %    Nucleated RBCs 0 0 /100    Absolute Neutrophil 8.4 (H) 1.6 - 8.3 10e9/L    Absolute Lymphocytes 2.6 0.8 - 5.3 10e9/L    Absolute Monocytes 1.0 0.0 - 1.3 10e9/L    Absolute Eosinophils 0.1 0.0 - 0.7 10e9/L    Absolute Basophils 0.0 0.0 - 0.2 10e9/L    Abs Immature Granulocytes 0.0 0 - 0.4 10e9/L    Absolute Nucleated RBC 0.0    Comprehensive metabolic panel   Result Value Ref Range    Sodium 135 133 - 144 mmol/L    Potassium 3.1 (L) 3.4 - 5.3 mmol/L    Chloride 103 94 - 109 mmol/L    Carbon Dioxide 26 20 - 32 mmol/L    Anion Gap 6 3 - 14 mmol/L    Glucose 89 70 - 99 mg/dL    Urea Nitrogen 12 7 - 30 mg/dL    Creatinine 0.51 (L) 0.52 - 1.04 mg/dL    GFR Estimate >90 >60 mL/min/[1.73_m2]      Comment:      Non  GFR Calc  Starting 12/18/2018, serum creatinine based estimated GFR (eGFR) will be    calculated using the Chronic Kidney Disease Epidemiology Collaboration   (CKD-EPI) equation.      GFR Estimate If Black >90 >60 mL/min/[1.73_m2]      Comment:       GFR Calc  Starting 12/18/2018, serum creatinine based estimated GFR (eGFR) will be   calculated using the Chronic Kidney Disease Epidemiology Collaboration   (CKD-EPI) equation.      Calcium 9.2 8.5 - 10.1 mg/dL    Bilirubin Total 0.4 0.2 - 1.3 mg/dL    Albumin 4.2 3.4 - 5.0 g/dL    Protein Total 7.8 6.8 - 8.8 g/dL    Alkaline Phosphatase 86 40 - 150 U/L    ALT 23 0 - 50 U/L    AST 15 0 - 45 U/L       If you have any concerns about these results please call and leave a message for me or send a [place] message to the clinic.    Sincerely,    Belgica Patel MD

## 2020-08-04 NOTE — PATIENT INSTRUCTIONS
Read up on the IUD and depo provera!    We will get stool test today - we will call with results.

## 2020-08-04 NOTE — PROGRESS NOTES
HPI       Uday Stauffer is a 35 year old  who presents for   Chief Complaint   Patient presents with     Abdominal Pain     x 1 year     1.5 years - had lots of testing done.  Gassy, burping, burning feeling.  Pain diffusely over abdomen.   Pain lasts 5-10 minutes, for about 1 week, then goes away, then comes back.   They got an ultrasound - didn't see anything (pelvic)  Worse with supine position.  No diarrhea, constipation.   Fatigued.   When she eats it's hard to sleep.  Ate around 12 pm yesterday - and hasnt eaten since then.    Takes ibuprofen - used to take a lot. Was told to cut down. Headache, neck pain (4-8 tablets a day). Now has completely cut down - NONE.    Weight loss: 132 lbs to 123 lbs from June to now.  No fevers, night sweats.    Wants depo shot  Had years ago. Had heavy periods with this.    Contraception  Had sex on Saturday. LMP was about 7/4/2020  Wants UPT. Think more about depo.       +++++++    Problem, Medication and Allergy Lists were reviewed and updated if needed..    Patient is an established patient of this clinic..         Review of Systems:   Review of Systems   7 point negative unless noted above         Physical Exam:     Vitals:    08/04/20 1613   BP: 127/87   Pulse: 69   Resp: 16   Temp: 98.6  F (37  C)   TempSrc: Oral   SpO2: 98%   Weight: 56.1 kg (123 lb 9.6 oz)     Body mass index is 24.14 kg/m .  Vitals were reviewed and were normal     Physical Exam  Constitutional:       General: She is not in acute distress.     Appearance: She is well-developed. She is not diaphoretic.   HENT:      Head: Normocephalic and atraumatic.   Eyes:      Conjunctiva/sclera: Conjunctivae normal.   Neck:      Musculoskeletal: Normal range of motion.   Cardiovascular:      Rate and Rhythm: Normal rate.   Pulmonary:      Effort: Pulmonary effort is normal. No respiratory distress.   Musculoskeletal: Normal range of motion.   Neurological:      General: No focal deficit present.      Mental  Status: She is alert.           Results:   Results are ordered and pending    Assessment and Plan        Uday was seen today for abdominal pain.    Diagnoses and all orders for this visit:    Dyspepsia  Will test for H pylori. Continue avoiding NSAIDs.   Does have weight loss though likely related to poor appetite and decreased intake. No other B symptoms. If symptoms persist or worsen, and H pylori negative, consider EGD.  -     Helicobacter pylori Antigen Stool; Future  -     CBC with platelets differential  -     Comprehensive metabolic panel    Other fatigue  -     CBC with platelets differential  -     Comprehensive metabolic panel    General counselling and advice on contraception  Encounter for initial prescription of other contraceptives  Initially desired depo but hx of significant AUB with this. Discussed IUDs today - she seems interested but will go home and read up on this some more as she has fears of uterine perforation. Will obtain UPT today per patient request.  -     HCG Qualitative Urine (UPT) ([facility])       There are no discontinued medications.    Options for treatment and follow-up care were reviewed with the patient. Uday Stauffer  engaged in the decision making process and verbalized understanding of the options discussed and agreed with the final plan.    Belgica Patel MD

## 2020-08-04 NOTE — PROGRESS NOTES
Preceptor Attestation:   Patient seen, evaluated and discussed with the resident. I have verified the content of the note, which accurately reflects my assessment of the patient and the plan of care.   Supervising Physician:  Lesvia Mccauley MD

## 2020-08-06 ENCOUNTER — TELEPHONE (OUTPATIENT)
Dept: FAMILY MEDICINE | Facility: CLINIC | Age: 35
End: 2020-08-06

## 2020-08-06 NOTE — TELEPHONE ENCOUNTER
"From Dr. Patel \"Please call patient with the following message:   Your results are reassuring. The only thing slightly abnormal is your white blood cell count being slightly elevated - which sometimes can indicate an infection, or inflammation. I am not concerned at these levels based on our discussions in clinic. Let's see what your stool tests show, and let us know if anything changes.\"    Left a message to return call. Please transfer to any available RN when patient calls back, thank you!    Valencia Diaz RN    "

## 2020-08-06 NOTE — TELEPHONE ENCOUNTER
She should have gone home with a kit to collect a stool sample at home. I was under the impression that she went home with the kit and will bring it back. If she did not receive a kit last time she can come in and pick it up.    Belgica Patel MD

## 2020-08-06 NOTE — TELEPHONE ENCOUNTER
Spoke with patient and relayed information below. She verbalized understanding and had no further questions.    Valencia Diaz RN

## 2020-08-07 DIAGNOSIS — R10.13 DYSPEPSIA: ICD-10-CM

## 2020-08-07 DIAGNOSIS — A04.8 H. PYLORI INFECTION: Primary | ICD-10-CM

## 2020-08-11 LAB — H PYLORI AG STL QL IA: POSITIVE

## 2020-08-11 RX ORDER — OMEPRAZOLE 40 MG/1
40 CAPSULE, DELAYED RELEASE ORAL DAILY
Qty: 14 CAPSULE | Refills: 0 | Status: SHIPPED | OUTPATIENT
Start: 2020-08-11 | End: 2020-08-25

## 2020-08-11 RX ORDER — METRONIDAZOLE 500 MG/1
500 TABLET ORAL 2 TIMES DAILY
Qty: 28 TABLET | Refills: 0 | Status: SHIPPED | OUTPATIENT
Start: 2020-08-11 | End: 2020-08-25

## 2020-08-11 RX ORDER — AMOXICILLIN 500 MG/1
1000 CAPSULE ORAL 2 TIMES DAILY
Qty: 56 CAPSULE | Refills: 0 | Status: SHIPPED | OUTPATIENT
Start: 2020-08-11 | End: 2020-08-25

## 2020-08-11 RX ORDER — CLARITHROMYCIN 500 MG
500 TABLET ORAL 2 TIMES DAILY
Qty: 28 TABLET | Refills: 0 | Status: SHIPPED | OUTPATIENT
Start: 2020-08-11 | End: 2020-08-25

## 2020-08-11 NOTE — RESULT ENCOUNTER NOTE
Please call patient with the following message:     Your stool test was positive for H pylori, which is a bacteria that can cause ulcers. This is likely contributing to your symptoms.  The treatment for this is a course of multiple antibiotics and an acid blocking medication. I will send this to your pharmacy. It will be for 2 weeks.  I would like for us to follow-up again 4 weeks after you finish the treatment.    Thanks Belgica Patel MD

## 2020-10-21 NOTE — PROGRESS NOTES
HPI       Uday Stauffer is a 35 year old  who presents for   Chief Complaint   Patient presents with     RECHECK     f/u h pylori     Medication Reconciliation     complete     Finished HP treatment around September. While on it felt good, but as it was stopped symptoms came back, lots of burping, discomfort. Burping is primary issue. No diarrhea but does have softer stools.    Early satiety. Very bloated with full meal - would eat snacks and salads mostly. Would eat once a day around 4-5 pm. Would have to make self throw up if she ate a bigger meal due to discomfort.  No constipation. Weight loss. (3 lbs since last visit, but she thinks more like 10 lbs since she is wearing heavy shoes).  Unsure if she had EGD before with GI specialist (saw in 2018). They did an U/S, lab tests, and ?vaginal scope? It may have been GYN??     +++++++    Problem, Medication and Allergy Lists were reviewed and updated if needed..    Patient is an established patient of this clinic..         Review of Systems:   Review of Systems  7 point ROS negative.       Physical Exam:     Vitals:    10/22/20 1623   BP: 96/59   Pulse: 81   Resp: 16   Temp: 98.4  F (36.9  C)   TempSrc: Oral   SpO2: 96%   Weight: 54.4 kg (120 lb)     Body mass index is 23.44 kg/m .  Vitals were reviewed and were normal     Physical Exam  Constitutional:       General: She is not in acute distress.     Appearance: She is well-developed. She is not diaphoretic.   HENT:      Head: Normocephalic and atraumatic.   Eyes:      Conjunctiva/sclera: Conjunctivae normal.   Neck:      Musculoskeletal: Normal range of motion.   Cardiovascular:      Rate and Rhythm: Normal rate.   Pulmonary:      Effort: Pulmonary effort is normal. No respiratory distress.   Abdominal:      Palpations: Abdomen is soft.      Tenderness: There is no abdominal tenderness. There is no guarding.   Musculoskeletal: Normal range of motion.   Neurological:      General: No focal deficit present.       Mental Status: She is alert.           Results:   Results are ordered and pending    Assessment and Plan        Uday was seen today for recheck and medication reconciliation.    Diagnoses and all orders for this visit:    Dyspepsia  H. pylori infection  Ongoing dyspepsia, bloating, burping, distention s/p h pylori treatment with 4 med regimen, though these did improve while she was on treatment. Does have concerning features including early satiety to point of self-inflicted emesis for relief. Subjective weight loss but not on review of weights in clinic.   Discussed EGD vs re-testing HP. We decided to re-test first. If positive, will treat again. If negative, then consider EGD.  -     Helicobacter pylori Antigen Stool; Future    Recommended flu shot. Accepted, but will get elsewhere       There are no discontinued medications.    Options for treatment and follow-up care were reviewed with the patient. Uday Stauffer  engaged in the decision making process and verbalized understanding of the options discussed and agreed with the final plan.    Belgica Patel MD

## 2020-10-22 ENCOUNTER — OFFICE VISIT (OUTPATIENT)
Dept: FAMILY MEDICINE | Facility: CLINIC | Age: 35
End: 2020-10-22
Payer: COMMERCIAL

## 2020-10-22 VITALS
TEMPERATURE: 98.4 F | HEART RATE: 81 BPM | DIASTOLIC BLOOD PRESSURE: 59 MMHG | OXYGEN SATURATION: 96 % | WEIGHT: 120 LBS | RESPIRATION RATE: 16 BRPM | BODY MASS INDEX: 23.44 KG/M2 | SYSTOLIC BLOOD PRESSURE: 96 MMHG

## 2020-10-22 DIAGNOSIS — A04.8 H. PYLORI INFECTION: Primary | ICD-10-CM

## 2020-10-22 DIAGNOSIS — R10.13 DYSPEPSIA: ICD-10-CM

## 2020-10-22 PROCEDURE — 99213 OFFICE O/P EST LOW 20 MIN: CPT | Mod: GC | Performed by: STUDENT IN AN ORGANIZED HEALTH CARE EDUCATION/TRAINING PROGRAM

## 2020-10-22 NOTE — PROGRESS NOTES
Preceptor Attestation:   Patient seen and discussed with the resident. Assessment and plan reviewed with resident and agreed upon.   Supervising Physician:  DO [facility]

## 2020-10-22 NOTE — PATIENT INSTRUCTIONS
We are testing you for H pylori (Helicobacter pylori bacteria).  We will talk after your results come back.    Please get the flu shot!

## 2020-11-05 DIAGNOSIS — A04.8 H. PYLORI INFECTION: ICD-10-CM

## 2020-11-05 DIAGNOSIS — R10.13 DYSPEPSIA: ICD-10-CM

## 2020-11-06 PROCEDURE — 87338 HPYLORI STOOL AG IA: CPT | Performed by: FAMILY MEDICINE

## 2020-11-09 LAB — H PYLORI AG STL QL IA: NEGATIVE

## 2020-11-09 NOTE — RESULT ENCOUNTER NOTE
Please call patient with the following message:  Robert Frye, just letting your know that your repeat H pylori test was negative.  If you have any questions re: next steps, let's plan for another visit    Thanks Belgica Patel MD

## 2020-11-19 ENCOUNTER — OFFICE VISIT (OUTPATIENT)
Dept: FAMILY MEDICINE | Facility: CLINIC | Age: 35
End: 2020-11-19
Payer: COMMERCIAL

## 2020-11-19 VITALS
BODY MASS INDEX: 23.75 KG/M2 | DIASTOLIC BLOOD PRESSURE: 65 MMHG | SYSTOLIC BLOOD PRESSURE: 114 MMHG | WEIGHT: 121.6 LBS | HEART RATE: 90 BPM | OXYGEN SATURATION: 98 %

## 2020-11-19 DIAGNOSIS — B96.89 BACTERIAL VAGINOSIS: ICD-10-CM

## 2020-11-19 DIAGNOSIS — R30.0 DYSURIA: Primary | ICD-10-CM

## 2020-11-19 DIAGNOSIS — Z32.01 PREGNANCY TEST POSITIVE: ICD-10-CM

## 2020-11-19 DIAGNOSIS — N76.0 BACTERIAL VAGINOSIS: ICD-10-CM

## 2020-11-19 DIAGNOSIS — Z13.9 SCREENING FOR CONDITION: ICD-10-CM

## 2020-11-19 LAB
BACTERIA: NORMAL
BILIRUBIN UR: NEGATIVE MG/DL
BLOOD UR: NEGATIVE MG/DL
CLUE CELLS: NORMAL
GLUCOSE URINE: NEGATIVE
HCG UR QL: POSITIVE
KETONES UR QL: NEGATIVE MG/DL
LEUKOCYTE ESTERASE UR: NEGATIVE
MOTILE TRICHOMONAS: NEGATIVE
NITRITE UR QL STRIP: NEGATIVE MG/DL
ODOR: NORMAL
PH UR STRIP: 7 [PH] (ref 4.5–8)
PH WET PREP: >4.5 (ref 3.8–4.5)
PROTEIN UR: NEGATIVE MG/DL
SP GR UR STRIP: 1.02 (ref 1–1.03)
UROBILINOGEN UR STRIP-ACNC: NORMAL E.U./DL
WBC WET PREP: <2 (ref 2–5)
YEAST: NORMAL

## 2020-11-19 PROCEDURE — 99214 OFFICE O/P EST MOD 30 MIN: CPT | Mod: GC | Performed by: STUDENT IN AN ORGANIZED HEALTH CARE EDUCATION/TRAINING PROGRAM

## 2020-11-19 PROCEDURE — 87210 SMEAR WET MOUNT SALINE/INK: CPT | Performed by: STUDENT IN AN ORGANIZED HEALTH CARE EDUCATION/TRAINING PROGRAM

## 2020-11-19 PROCEDURE — 81003 URINALYSIS AUTO W/O SCOPE: CPT | Performed by: STUDENT IN AN ORGANIZED HEALTH CARE EDUCATION/TRAINING PROGRAM

## 2020-11-19 PROCEDURE — 81025 URINE PREGNANCY TEST: CPT | Performed by: STUDENT IN AN ORGANIZED HEALTH CARE EDUCATION/TRAINING PROGRAM

## 2020-11-19 ASSESSMENT — ENCOUNTER SYMPTOMS
POLYDIPSIA: 0
HEMATURIA: 0
VOMITING: 0
MUSCULOSKELETAL NEGATIVE: 1
EYES NEGATIVE: 1
DYSURIA: 1
NEUROLOGICAL NEGATIVE: 1
PSYCHIATRIC NEGATIVE: 1
FLANK PAIN: 0
NAUSEA: 0
RESPIRATORY NEGATIVE: 1
CHILLS: 0
HEMATOLOGIC/LYMPHATIC NEGATIVE: 1
CARDIOVASCULAR NEGATIVE: 1
GASTROINTESTINAL NEGATIVE: 1
FREQUENCY: 1
FEVER: 0

## 2020-11-19 NOTE — PROGRESS NOTES
Assessment and Plan   Uday Stauffer is a 35 year old woman who presents with 2 weeks of dysuria and mild vaginal discharge that has improved since onset and concern for new pregnancy (LMP Oct 1).     Dysuria  Urine pregnancy test positive. UA bland. Wet prep positive for <20% clue cells and abnormal pH. Pregnancy is undesired - she already made an appointment for Saturday to discuss termination at another clinic before she came in today. Cannot r/u gonorrhea or chlamydia, but given shortage of tests and lack of significant discharge or pelvic pain, will treat with abx for BV and follow up if symptoms do not improve. Could be 2/2 pregnancy as well.   - Urinalysis, Micro If (UA) ([facility])  - Wet Prep ([facility])  - HCG Qualitative Urine (UPT) ([facility])    There are no discontinued medications.    Options for treatment and follow-up care were reviewed with the patient. Uday Stauffer  engaged in the decision making process and verbalized understanding of the options discussed and agreed with the final plan.           HPI       Uday Stauffer is a 35 year old woman who presents with 2 weeks of dysuria with frequency and urgency but no hematuria or hesitancy. Symptoms have improved since onset. No fever, chills, n/v, flank pain, or abdominal pain. No vaginal discharge, pain, or pruritis - maybe some odd odor. No pelvic pain. She is sexually active with same male partner of 3 months, they do not use condoms. She is fairly certain that he doesn't have an STI, but doesn't know for sure. She does not have a history of frequent UTIs, and has had an STI but it was years ago. LMP was Oct 1, does not desire pregnancy.     Chief Complaint   Patient presents with     Dysuria     x 2 weeks     Vaginal Problem     discahrge x 2 weeks 10/1/20 LMP       Urinary Symptoms  Onset: 2 weeks ago    Description:   Painful urination (Dysuria): Yes Details: burning  Frequent urination:  Yes   Need to urinate urgently: Yes   Blood in  urine (Hematuria):no  Delay in urine (Hesitency): no    Intensity: mild    Progression of Symptoms:  improving    Accompanying Signs & Symptoms:  Fever/chills: no  Flank pain no  Nausea and vomiting: no  Any vaginal discharge/symptoms: no discharge or itching, maybe odd smell  Abdominal/Pelvic Pain: no    History:   History of frequent UTI's: no  History of kidney stones: no  Sexually Active: Yes Details: monogamous with man for 3 months, does not use condoms  Possibility of pregnancy:Yes Details: LMP was Oct 1. Not trying to get pregnant, does not want to be pregnant.     Problem, Medication and Allergy Lists were reviewed and updated if needed..    Patient is an established patient of this clinic..         Review of Systems:   Review of Systems   Constitutional: Negative for chills and fever.   HENT: Negative.    Eyes: Negative.    Respiratory: Negative.    Cardiovascular: Negative.    Gastrointestinal: Negative.  Negative for nausea and vomiting.   Endocrine: Positive for cold intolerance and polyuria. Negative for polydipsia.   Genitourinary: Positive for dysuria, frequency, urgency and vaginal discharge. Negative for decreased urine volume, flank pain, genital sores, hematuria, pelvic pain, vaginal bleeding and vaginal pain.        Only mildly increased vaginal discharge, maybe normal for her per pt.    Musculoskeletal: Negative.    Neurological: Negative.    Hematological: Negative.    Psychiatric/Behavioral: Negative.             Physical Exam:     Vitals:    11/19/20 1556   BP: 114/65   Pulse: 90   SpO2: 98%   Weight: 55.2 kg (121 lb 9.6 oz)     Body mass index is 23.75 kg/m .  Vitals were reviewed and were normal     Physical Exam  General: Alert and oriented, in no acute distress.  Skin: Warm and dry, no abnormalities noted.  Eyes: Extra-ocular muscles intact  ENT: Speech intact, nasal passages open, no hearing impairment noted.  CV: No cyanosis or pallor, warm and well perfused.  Respiratory: No  respiratory distress, no accessory muscle use.  Neuro: Gait and station normal, comprehension intact. Gross and fine motor skills intact.   Psychiatric: Mood and affect appear normal.   Extremities: Warm, able to move all four extremities at will.  : did not perform pelvic exam today      Results:      Results from this visit  Results for orders placed or performed in visit on 11/19/20   HCG Qualitative Urine (UPT) ([facility])     Status: Abnormal   Result Value Ref Range    HCG Qual Urine POSITIVE (A) Negative   Urinalysis, Micro If (UA) ([facility])     Status: None   Result Value Ref Range    Specific Gravity Urine 1.020 1.005 - 1.030    pH Urine 7.0 4.5 - 8.0    Leukocyte Esterase UR Negative NEGATIVE    Nitrite Urine Negative NEGATIVE mg/dL    Protein UR Negative NEGATIVE mg/dL    Glucose Urine Negative NEGATIVE    Ketones Urine Negative NEGATIVE mg/dL    Urobilinogen mg/dL 0.2 E.U./dL 0.2 E.U./dL E.U./dL    Bilirubin UR Negative NEGATIVE mg/dL    Blood UR Negative NEGATIVE mg/dL   Wet Prep ([facility])     Status: None   Result Value Ref Range    Yeast Wet Prep None none    Motile Trichomonas Wet Prep Negative Negative    Clue Cells Wet Prep Present <20% NONE    WBC WET PREP <2 2 - 5    Bacteria Wet Prep Few None    pH Wet Prep >4.5 3.8 - 4.5    Odor Wet Prep None NONE     Jamila Brandt, MS3    Resident/Fellow Attestation   I, Osmany Lopez, was present with the medical student who participated in the service and in the documentation of the note.  I have verified the history and personally performed the physical exam and medical decision making.  I agree with the assessment and plan of care as documented in the note.      Osmany Lopez MD  [facility]  PGY-3, [phone]

## 2020-11-19 NOTE — Clinical Note
Hey,  This patient had a positive pregnancy test last week, but is planning for termination and had an appointment set up already. Just wanted to send as an FYI in case she needs help with termination or for whatever reason she changes her mind.    Osmany

## 2020-11-20 RX ORDER — METRONIDAZOLE 500 MG/1
500 TABLET ORAL 2 TIMES DAILY
Qty: 14 TABLET | Refills: 0 | Status: SHIPPED | OUTPATIENT
Start: 2020-11-20 | End: 2020-11-27

## 2021-01-13 NOTE — TELEPHONE ENCOUNTER
"Spoke with patient and relayed information below. Patient verbalized understanding but stated that she never did the stool test. Looks like it was ordered and note from visit stated \"We will get stool test today.\" Does patient need to schedule a lab only appointment for this?    She would like a detailed message to be left if she doesn't answer the phone.    Valencia Diaz RN    " [Negative] : Heme/Lymph

## 2021-07-16 ENCOUNTER — OFFICE VISIT (OUTPATIENT)
Dept: FAMILY MEDICINE | Facility: CLINIC | Age: 36
End: 2021-07-16
Payer: COMMERCIAL

## 2021-07-16 VITALS
BODY MASS INDEX: 21.29 KG/M2 | HEART RATE: 75 BPM | WEIGHT: 109 LBS | DIASTOLIC BLOOD PRESSURE: 72 MMHG | TEMPERATURE: 98.2 F | OXYGEN SATURATION: 100 % | SYSTOLIC BLOOD PRESSURE: 108 MMHG | RESPIRATION RATE: 16 BRPM

## 2021-07-16 DIAGNOSIS — F17.200 TOBACCO USE DISORDER: ICD-10-CM

## 2021-07-16 DIAGNOSIS — L30.9 DERMATITIS: ICD-10-CM

## 2021-07-16 DIAGNOSIS — G43.109 MIGRAINE WITH AURA AND WITHOUT STATUS MIGRAINOSUS, NOT INTRACTABLE: ICD-10-CM

## 2021-07-16 DIAGNOSIS — Z30.09 GENERAL COUNSELING FOR PRESCRIPTION OF ORAL CONTRACEPTIVES: Primary | ICD-10-CM

## 2021-07-16 PROBLEM — Z87.59 HX OF ONE MISCARRIAGE: Status: ACTIVE | Noted: 2021-07-16

## 2021-07-16 PROBLEM — Z32.01 PREGNANCY TEST POSITIVE: Status: RESOLVED | Noted: 2017-05-17 | Resolved: 2021-07-16

## 2021-07-16 LAB — HCG UR QL: NEGATIVE

## 2021-07-16 PROCEDURE — 99214 OFFICE O/P EST MOD 30 MIN: CPT | Mod: GC | Performed by: STUDENT IN AN ORGANIZED HEALTH CARE EDUCATION/TRAINING PROGRAM

## 2021-07-16 PROCEDURE — 81025 URINE PREGNANCY TEST: CPT | Performed by: STUDENT IN AN ORGANIZED HEALTH CARE EDUCATION/TRAINING PROGRAM

## 2021-07-16 RX ORDER — METRONIDAZOLE 500 MG/1
TABLET ORAL
COMMUNITY
Start: 2020-11-28 | End: 2021-07-16

## 2021-07-16 RX ORDER — TRIAMCINOLONE ACETONIDE 1 MG/G
OINTMENT TOPICAL
Qty: 45 G | Refills: 3 | Status: SHIPPED | OUTPATIENT
Start: 2021-07-16 | End: 2023-02-02

## 2021-07-19 NOTE — PROGRESS NOTES
Preceptor Attestation:   Patient seen, evaluated and discussed with the resident. I have verified the content of the note, which accurately reflects my assessment of the patient and the plan of care.   Supervising Physician:  Rachelle Carmona, DO

## 2021-08-11 ENCOUNTER — OFFICE VISIT (OUTPATIENT)
Dept: FAMILY MEDICINE | Facility: CLINIC | Age: 36
End: 2021-08-11
Payer: COMMERCIAL

## 2021-08-11 VITALS
DIASTOLIC BLOOD PRESSURE: 79 MMHG | SYSTOLIC BLOOD PRESSURE: 113 MMHG | HEART RATE: 82 BPM | TEMPERATURE: 98.2 F | RESPIRATION RATE: 16 BRPM | OXYGEN SATURATION: 99 %

## 2021-08-11 DIAGNOSIS — Z20.822 ENCOUNTER FOR LABORATORY TESTING FOR COVID-19 VIRUS: ICD-10-CM

## 2021-08-11 DIAGNOSIS — Z13.9 SCREENING FOR CONDITION: Primary | ICD-10-CM

## 2021-08-11 PROCEDURE — 99213 OFFICE O/P EST LOW 20 MIN: CPT | Performed by: FAMILY MEDICINE

## 2021-08-11 NOTE — PROGRESS NOTES
Assessment & Plan       Encounter for laboratory testing for COVID-19 virus  Possible since she is not vaccinated and has been traveling.  Will do testing today.  Recommended vaccine but she is not ready.   - Symptomatic COVID-19 Virus (Coronavirus) by PCR Oropharynx             Tobacco Cessation:   reports that she has been smoking. She has never used smokeless tobacco.          No follow-ups on file.    Sumaya Benjamin MD  [facility] GAYLE Frye is a 36 year old who presents for the following health issues     HPI     Started about 1 week ago with a sore throat. As the day went by got worse and more coughing and headache and runny nose with sneezing.  Thought it might be the water since came back from [place].  Employer wants her to take a test before she goes back to work.  No sinus pain or pressure.  No tooth pain. Coughing up green.   Denies issues with smell and taste.  At times is SOB when coughs. Ok if not coughing.   No chest pain, no leg swelling.   No vaccine    Smokes: off and on - not recently    ROS: used to get a lot of headaches and took a lot of excedrin but that is much better.     Generally healthy with no chronic lung disease          Review of Systems         Objective    /79   Pulse 82   Temp 98.2  F (36.8  C) (Oral)   Resp 16   SpO2 99%   There is no height or weight on file to calculate BMI.  Physical Exam   GENERAL: healthy, alert and no distress  NECK: no adenopathy, no asymmetry, masses, or scars and thyroid normal to palpation  RESP: lungs clear to auscultation - no rales, rhonchi or wheezes  CV: regular rate and rhythm, normal S1 S2, no S3 or S4, no murmur, click or rub, no peripheral edema and peripheral pulses strong  MS: no gross musculoskeletal defects noted, no edema

## 2021-08-11 NOTE — LETTER
August 13, 2021      Uday Stauffer  [address]  [place] [address]        Dear Uday,    Thank you for getting your care at [facility]. Please see below for your test results.    Resulted Orders   SARS-COV2 (COVID-19) Virus RT-PCR   Result Value Ref Range    SARS CoV2 PCR Negative Negative      Comment:      NEGATIVE: SARS-CoV-2 (COVID-19) RNA not detected, presumed negative.    Narrative    Testing was performed using the Xpert Xpress SARS-CoV-2 Assay on the  Cepheid Gene-Xpert Instrument Systems. Additional information about  this Emergency Use Authorization (EUA) assay can be found via the Lab  Guide. This test should be ordered for the detection of SARS-CoV-2 in  individuals who meet SARS-CoV-2 clinical and/or epidemiological  criteria. Test performance is unknown in asymptomatic patients. This  test is for in vitro diagnostic use under the FDA EUA for  laboratories certified under CLIA to perform high complexity testing.  This test has not been FDA cleared or approved. A negative result  does not rule out the presence of PCR inhibitors in the specimen or  target RNA in concentration below the limit of detection for the  assay. The possibility of a false negative should be considered if  the patient's recent exposure or clinical presentation suggests  COVID-19. This test was validated by the [facility]. This laboratory is certified under  the Clinical Laboratory Improvement Amendments of 1988 (CLIA-88) as  qualified to perform high complexity laboratory testing.         If you have any concerns about these results please call and leave a message for me or send a MyChart message to the clinic.    Sincerely,    Sumaya Benjamin MD

## 2021-08-12 NOTE — PATIENT INSTRUCTIONS
"Discharge Instructions for COVID-19 Patients  You may have COVID-19. Please follow the instructions listed below.   If you have a weakened immune system, discuss with your doctor any other actions you need to take.  How can I protect others?  If you have symptoms (fever, cough, body aches or trouble breathing):    Stay home and away from others (self-isolate) until:  ? Your other symptoms have resolved (gotten better). And   ? You've had no fever--and no medicine that reduces fever--for 1 full day (24 hours). And   ? At least 10 days have passed since your symptoms started. (You may need to wait 20 days. Follow the advice of your care team.)  If you don't show symptoms, but testing showed that you have COVID-19:    Stay home and away from others (self-isolate) until at least 10 days have passed since the date of your first positive COVID-19 test.  During this time    Stay in your own room, even for meals. Use your own bathroom if you can.    Stay away from others in your home. No hugging, kissing or shaking hands. No visitors.    Don't go to work, school or anywhere else.    Clean \"high touch\" surfaces often (doorknobs, counters, handles). Use household cleaning spray or wipes.    You'll find a full list of  on the EPA website: www.epa.gov/pesticide-registration/list-n-disinfectants-use-against-sars-cov-2.    Cover your mouth and nose with a mask or other face covering to avoid spreading germs.    Wash your hands and face often. Use soap and water.    Caregivers in these groups are at risk for severe illness due to COVID-19:  ? People 65 years and older  ? People who live in a nursing home or long-term care facility  ? People with chronic disease (lung, heart, cancer, diabetes, kidney, liver, immunologic)  ? People who have a weakened immune system, including those who:    Are in cancer treatment    Take medicine that weakens the immune system, such as corticosteroids    Had a bone marrow or organ " transplant    Have an immune deficiency    Have poorly controlled HIV or AIDS    Are obese (body mass index of 40 or higher)    Smoke regularly    Caregivers should wear gloves while washing dishes, handling laundry and cleaning bedrooms and bathrooms.    Use caution when washing and drying laundry: Don't shake dirty laundry and use the warmest water setting that you can.    For more tips on managing your health at home, go to www.cdc.gov/coronavirus/2019-ncov/downloads/10Things.pdf.  How can I take care of myself at home?  1. Get lots of rest. Drink extra fluids (unless a doctor has told you not to).  2. Take Tylenol (acetaminophen) for fever or pain. If you have liver or kidney problems, ask your family doctor if it's okay to take Tylenol.   Adults can take either:   ? 650 mg (two 325 mg pills) every 4 to 6 hours, or   ? 1,000 mg (two 500 mg pills) every 8 hours as needed.  ? Note: Don't take more than 3,000 mg in one day. Acetaminophen is found in many medicines (both prescribed and over-the-counter medicines). Read all labels to be sure you don't take too much.   For children, check the Tylenol bottle for the right dose. The dose is based on the child's age or weight.  3. If you have other health problems (like cancer, heart failure, an organ transplant or severe kidney disease): Call your specialty clinic if you don't feel better in the next 2 days.  4. Know when to call 911. Emergency warning signs include:  ? Trouble breathing or shortness of breath  ? Pain or pressure in the chest that doesn't go away  ? Feeling confused like you haven't felt before, or not being able to wake up  ? Bluish-colored lips or face  5. Your doctor may have prescribed a blood thinner medicine. Follow their instructions.  Where can I get more information?     [employer] - About COVID-19:   [email or web address]    CDC - What to Do If You're Sick:  www.cdc.gov/coronavirus/2019-ncov/about/steps-when-sick.html    CDC - Ending Home Isolation: www.cdc.gov/coronavirus/2019-ncov/hcp/disposition-in-home-patients.html    CDC - Caring for Someone: www.cdc.gov/coronavirus/2019-ncov/if-you-are-sick/care-for-someone.html    [facility] - Interim Guidance for Hospital Discharge to Home: www.health.[facility].[place]./diseases/coronavirus/hcp/hospdischarge.pdf    Below are the COVID-19 hotlines at the [place] Department of Health ([facility]). Interpreters are available.  ? For health questions: Call 947-297-7874 or 1-207.489.8115 (7 a.m. to 7 p.m.)  ? For questions about schools and childcare: Call 988-310-0760 or 1-887.635.5201 (7 a.m. to 7 p.m.)    For informational purposes only. Not to replace the advice of your health care provider. Clinically reviewed by Dr. Yayo Guerrero.   Copyright   2020 [facility]. All rights reserved. [employer] 847379 - REV 01/05/21.

## 2022-01-05 ENCOUNTER — OFFICE VISIT (OUTPATIENT)
Dept: FAMILY MEDICINE | Facility: CLINIC | Age: 37
End: 2022-01-05
Payer: COMMERCIAL

## 2022-01-05 VITALS
OXYGEN SATURATION: 96 % | TEMPERATURE: 98.5 F | DIASTOLIC BLOOD PRESSURE: 79 MMHG | HEART RATE: 86 BPM | SYSTOLIC BLOOD PRESSURE: 115 MMHG | RESPIRATION RATE: 16 BRPM

## 2022-01-05 DIAGNOSIS — Z13.9 SCREENING FOR CONDITION: Primary | ICD-10-CM

## 2022-01-05 PROCEDURE — 99213 OFFICE O/P EST LOW 20 MIN: CPT | Mod: GC | Performed by: STUDENT IN AN ORGANIZED HEALTH CARE EDUCATION/TRAINING PROGRAM

## 2022-01-05 PROCEDURE — U0003 INFECTIOUS AGENT DETECTION BY NUCLEIC ACID (DNA OR RNA); SEVERE ACUTE RESPIRATORY SYNDROME CORONAVIRUS 2 (SARS-COV-2) (CORONAVIRUS DISEASE [COVID-19]), AMPLIFIED PROBE TECHNIQUE, MAKING USE OF HIGH THROUGHPUT TECHNOLOGIES AS DESCRIBED BY CMS-2020-01-R: HCPCS | Performed by: STUDENT IN AN ORGANIZED HEALTH CARE EDUCATION/TRAINING PROGRAM

## 2022-01-05 PROCEDURE — U0005 INFEC AGEN DETEC AMPLI PROBE: HCPCS | Performed by: STUDENT IN AN ORGANIZED HEALTH CARE EDUCATION/TRAINING PROGRAM

## 2022-01-05 NOTE — PROGRESS NOTES
Assessment & Plan     Screening for condition  Patient presenting with subjective symptoms warranting Covid screening.  Exam today reassuring.  No current evidence of severe Covid.  No findings suggesting antiviral nor antibiotic use.  No current indications suggesting further work-up today.  Return precautions given instructed patient to stay hydrated, remain isolated while awaiting Covid testing results.  Patient voiced understanding and agreement with plan of care.  - Symptomatic; Unknown COVID-19 Virus (Coronavirus) by PCR Nose    Return if symptoms worsen or fail to improve.    Annmarie Gaona DO  [facility]    Annmarie Gaona DO  [facility]  PGY-2    Subjective     Uday Stauffer is a 37 year old who presents to clinic today for the following health issues     Symptoms started Sunday  Exposure to cousin who was symptomatic from Friday to Sunday.  Patient was with cousin on Saturday.  Cousin tested Monday and was positive.    Patient's current symptoms include :  headache  Runny nose   Cold /hot  Facial pressure especially felt when coughing  When coughs has green phlegm  excedrin for headache didn't help  She has not been Covid vaccinated        Objective    /79   Pulse 86   Temp 98.5  F (36.9  C) (Oral)   Resp 16   SpO2 96%   There is no height or weight on file to calculate BMI.  Physical Exam  Vitals and nursing note reviewed.   Constitutional:       General: She is not in acute distress.     Appearance: Normal appearance. She is not ill-appearing.   HENT:      Head: Normocephalic and atraumatic.      Right Ear: External ear normal.      Left Ear: External ear normal.      Nose: Congestion and rhinorrhea present.      Mouth/Throat:      Mouth: Mucous membranes are moist.      Pharynx: Oropharyngeal exudate and posterior oropharyngeal erythema present.      Comments: Postnasal drip evident at back of throat  Eyes:      General: No scleral icterus.     Extraocular  Movements: Extraocular movements intact.      Conjunctiva/sclera: Conjunctivae normal.   Cardiovascular:      Rate and Rhythm: Normal rate and regular rhythm.      Pulses: Normal pulses.      Heart sounds: Normal heart sounds. No murmur heard.      Pulmonary:      Effort: Pulmonary effort is normal. No respiratory distress.      Breath sounds: Normal breath sounds. No wheezing or rhonchi.   Musculoskeletal:      Cervical back: Normal range of motion.      Right lower leg: No edema.      Left lower leg: No edema.   Skin:     General: Skin is warm and dry.      Capillary Refill: Capillary refill takes less than 2 seconds.      Comments: No rash at exposed skin   Neurological:      General: No focal deficit present.      Mental Status: She is alert. Mental status is at baseline.   Psychiatric:         Mood and Affect: Mood normal.         Behavior: Behavior normal.

## 2022-01-06 LAB — SARS-COV-2 RNA RESP QL NAA+PROBE: NEGATIVE

## 2022-05-20 ENCOUNTER — TELEPHONE (OUTPATIENT)
Dept: PSYCHOLOGY | Facility: CLINIC | Age: 37
End: 2022-05-20

## 2022-05-20 ENCOUNTER — OFFICE VISIT (OUTPATIENT)
Dept: FAMILY MEDICINE | Facility: CLINIC | Age: 37
End: 2022-05-20
Payer: COMMERCIAL

## 2022-05-20 VITALS
HEART RATE: 96 BPM | DIASTOLIC BLOOD PRESSURE: 80 MMHG | BODY MASS INDEX: 20.86 KG/M2 | OXYGEN SATURATION: 97 % | TEMPERATURE: 98.1 F | SYSTOLIC BLOOD PRESSURE: 135 MMHG | RESPIRATION RATE: 16 BRPM | WEIGHT: 106.8 LBS

## 2022-05-20 DIAGNOSIS — F43.0 ACUTE REACTION TO STRESS: ICD-10-CM

## 2022-05-20 DIAGNOSIS — F51.02 ADJUSTMENT INSOMNIA: Primary | ICD-10-CM

## 2022-05-20 PROCEDURE — 99214 OFFICE O/P EST MOD 30 MIN: CPT | Performed by: FAMILY MEDICINE

## 2022-05-20 RX ORDER — HYDROXYZINE HYDROCHLORIDE 25 MG/1
25 TABLET, FILM COATED ORAL 3 TIMES DAILY PRN
Qty: 90 TABLET | Refills: 0 | Status: SHIPPED | OUTPATIENT
Start: 2022-05-20 | End: 2024-04-12
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
## 2022-05-20 NOTE — PATIENT INSTRUCTIONS
Your safety as well as the safety of those around you is important to us. Should you or someone else be in need of them, here are some additional resources:    Feeling overwhelmed and just need a supportive person to talk through a struggling time? (hours 12 PM - 10 PM CST)  Toll Free [phone] or text  Support  to 74374  The [place] Warmline provides a pviz-cc-ajik approach to mental health recovery, support and wellness. Calls are answered by professionally trained Certified Peer Specialists, who have first hand experience living with a mental health condition. (hours 12 PM - 10 PM CST)    Do you feel like you might be in crisis and potentially need professional services?   [facility] Adult Mental Health Crisis:  [phone]  [facility] Childrens Mental Health Crisis: [phone]    [facility] Adult Mental Health Crisis: [phone]  [facility] Children's Mental Health Crisis: [phone]    [facility] Multilingual Crisis Line:  [phone]     Crisis Text Line: People who text [place] to 620766 will be connected with a counselor.     [place] Domestic Violence Crisis Line (24-hour [place] crisis line) 1-884.144.2102    Find a local Alcohol or Narcotics Anonymous meeting:  Https://aam[place].org/  https://www.nam[place].org/    If you feel at risk of immediate harm, call 9-1-1 or go directly to the Emergency Department.

## 2022-05-20 NOTE — Clinical Note
FYI - I ended up scheduling her with me during ICC because your slot got filled before I could talk with her, and she can only do Fridays.

## 2022-05-23 NOTE — PROGRESS NOTES
Preceptor Attestation:  I have reviewed and agree with the behavioral health fellow's documentation for this visit.  I did not see the patient.  Supervising Clinical Psychologist:  Alysa Quezada PSYD LP

## 2022-06-03 ENCOUNTER — OFFICE VISIT (OUTPATIENT)
Dept: PSYCHOLOGY | Facility: CLINIC | Age: 37
End: 2022-06-03
Payer: COMMERCIAL

## 2022-06-03 DIAGNOSIS — F43.10 PTSD (POST-TRAUMATIC STRESS DISORDER): Primary | ICD-10-CM

## 2022-06-03 PROCEDURE — 90834 PSYTX W PT 45 MINUTES: CPT | Mod: HN | Performed by: PSYCHOLOGIST

## 2022-06-03 NOTE — PROGRESS NOTES
"  Behavioral Health Progress Note    Client's Legal Name: Uday Stauffer    Client's Preferred Name: Uday  YOB: 1985  Type of Service: in-person, counseling  Length of Service:   Start time: 3:30PM   End time: 4:10PM   Duration: 40 minutes  Attendees: patient    Identifying Information and Presenting Problem:  This was a first session with Uday, who is a 37 year old female being seen for problematic symptoms of PTSD.    Treatment Objective(s) Addressed in This Session:  Gather information regarding presenting concerns and history   Establish rapport    Progress on / Status of Treatment Objective(s) / Homework:  Establishing care today      Topics Discussed/Interventions Provided:  This was my first visit with this Uday. We reviewed her rights to and the limits of confidentiality. This discussion also included an overview of integrated care as well as the role of open notes in their electronic health record. Uday was also informed of my position as a post-doctoral fellow and given my supervisor's contact information. Patient was encouraged to ask questions and verbally consented to services provided today.    Presenting concerns:     Uday reports that she is still struggling to eat food and sleep. She reflected that coffee makes anxiety worse so she has cut back on this. She notes a lot of muscle tension.     Uday has trouble focusing at work because she worries about her home being raided and her son being there. She reports flashbacks and wondering about possible \"what ifs\" from when the police raided her home.     Medication: Uday tried hydroxizine a couple of times but did not like how it sedated her. She expresses wanting to something to stop flashbacks but not something that just makes her want to lay around. She was receptive to pursuing other types of medications.     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 son, who is currently attending an alternative school. He struggled to complete school work online during pandemic and Uday could not be home to supervise him.     Interventions: Provided psychoeducation on responses to traumatic events and normalized patient's reaction. Shared information on common trauma treatments, including exposure-based therapies and possible medication. Taught some grounding strategies to use when overwhelmed by flashbacks. Coached Uday to try hydroxizine just before bed, if she doesn't like how it feels throughout the day. Described other types of medication (e.g. SSRIs).     Assessment:   The patient appeared to be active and engaged in today's session and was receptive to feedback.     Mental Status:   During interaction with the examiner today, dUay was cooperative, open, engaged, pleasant and crying. Patient was generally alert and oriented to person, place, time, and situation. They were appropriately groomed, appeared stated age and wearing hijab. Patient's attire was appropriate for the weather and occasion. Eye contact was good. Psychomotor functioning:  restless. Speech was normal limits tone, rate, volume; largely coherent and relevant to topic. Mood was distraught; affect was labile. Thought processes were unremarkable. Thought content was not remarkable with no evidence of psychotic features and no evidence of suicide, homicide, or nonsuicidal self injury related thoughts, intent, urges, planning, behavior/recent attempts. Memory appeared grossly intact without being formally evaluated. Insight: adequate. Judgment was good. Patient exhibited good impulse control during the appointment.   Does the patient appear to be at imminent risk of harm to self/others at this time? No    The session was necessary to address symptoms of trauma that have been interfering with patient's ability to function in areas related to work,  parenting, maintaining personal health and physical well-being and day to day self care or health behaviors. Ongoing psychotherapy is necessary to provide counseling.    DSM-5 Diagnosis:  A complete diagnostic was not feasible at today's visit.  A provisional diagnosis of  PTSD is being given today pending further assessment.     Plan:  1. Follow up with this provider 6/10   2. Utilize grounding strategies as needed   3. Consider WET intervention following diagnostic assessment   4. Collaborate with PCP regarding other potential psychiatric medications   5. After Visit Summary was printed for patient    SONNY HANEY, PhD    NOTE: Treatment plan due third session.  Diagnostic assessment due by third session.

## 2022-06-03 NOTE — PATIENT INSTRUCTIONS
[facility]  Behavioral Health and Addiction Services  [address] [place] [address]  [phone]      First Session Patient Information Sheet    My name is Patrick Pandey, Ph.D. and I look forward to working with you! I earned my Ph.D. in Counseling Psychology at [facility] [employer] [facility]. I am currently in a postdoctoral fellowship at [facility] to receive specialized training in primary care behavioral health. I am also working to complete my supervised hours to become a licensed psychologist in the [facility] [place].     My direct supervisor at the [facility] is licensed psychologist, Alysa Quezada Psy.D. She and I meet individually each week to discuss my training and clinical caseload. Just like the residents you may have worked with, the work you and I complete together will be billed under my supervisor's name. Therefore, you will likely see reports from your insurance provider with Dr. Quezada's name rather than mine. Dr. Quezaad can be reached at [phone] if you have any questions or concerns.       Here is some general information about behavioral health services. Please note that your exact experience may be different based on our discussion today.    Your first 1-2 sessions are focused on assessment.This means that I will be exploring what brings you in today and what you are hoping to get out of behavioral health services. I will likely ask you a lot of questions about your current symptoms, health history, as well as information about your relationships, emotions, behaviors, experiences, childhood, family of origin, etc. I may also ask you to complete several questionnaires and checklists as part of that assessment process.    When the assessment is completed, we will review the results and work together to develop a plan for treatment. This discussion will include recommendations for services that are most appropriate for  you based on available research and practice guidelines. Many times, I will continue with your care unless it becomes clear that we need to refer you to another provider or organization better suited to meet your specific needs. We may also discuss other services that you may benefit from engaging with, such as the social work team or the clinic's legal partnership.    Most patients attend appointments once a week or once every other week at the beginning of treatment. However, we will discuss a schedule that best fits your needs.    If you get primary care services here at the clinic, I will also update your provider about your diagnosis and treatment plan to coordinate your care.    Due to confidentiality, I cannot exchange e-mail with patients. If you need to reach me, please leave a message through the  [phone].    I am committed to providing my patients with the best care possible. That means it is important to me that I provider services that fit for your unique needs and preferences. If there is anything you would like me to do differently, please let me know at any time!    Thank you, and I look forward to working with you!    Patrick Pandey, PhD  She/her/hers  Primary Care Behavioral Health Fellow

## 2022-06-24 ENCOUNTER — TELEPHONE (OUTPATIENT)
Dept: PSYCHOLOGY | Facility: CLINIC | Age: 37
End: 2022-06-24

## 2022-06-24 NOTE — TELEPHONE ENCOUNTER
Attempted to contact Uday, for outreach after being unable to reach her for in-person counseling appointment with this provider on 6/10 at 3:00 PM. This is patient's first no-show or late cancellation with this provider.    Attempted to contact patient by phone number recorded in medical record ending [id number] at 2:30 PM. Patient did not answer.    Left a voicemail message requesting the patient call the [facility]  at [phone] to re-schedule the appointment should she be interested.     Plan:     Patient does not have any follow up appointment scheduled with the clinic at this time.    Provider will make one additional outreach call.      Patrick Pandey, PhD  Pronouns: She/her/hers  Primary Care Health Behavior Fellow

## 2022-09-29 NOTE — PROGRESS NOTES
Assessment & Plan     General counseling for prescription of oral contraceptives  Migraine with aura and without status migrainosus, not intractable  Tobacco use disorder  Interested in birth control, worried about weight gain. Has tried depo in the past w/subsequent weight gain. Has a history of migraines w/aura and is a tobacco smoker. Would avoid estrogen due to stroke risk. Pt interested in nexplanon. UPT negative today. Referral placed to gyn clinic. Advised to make appt at .   - Colposcopy/[facility]necology [facility] INTERNAL REFERRAL; Future    Dermatitis  - HCG qualitative urine; Future  - triamcinolone (KENALOG) 0.1 % external ointment; Apply to affected areas of rash on neck and back twice daily. Use as instructed.  - HCG qualitative urine    Ordering of each unique test    Return if symptoms worsen or fail to improve.    Manish Keating DO  [facility] GAYLE Frye is a 36 year old who presents for the following health issues    HPI   1) discuss birth control  -worried about weight gain  -gained weight on depo  -occasional tobacco smoker  -migraines with aura    Review of Systems   Constitutional, HEENT, cardiovascular, pulmonary, GI, , musculoskeletal, neuro, skin, endocrine and psych systems are negative, except as otherwise noted.      Objective    /72   Pulse 75   Temp 98.2  F (36.8  C) (Oral)   Resp 16   Wt 49.4 kg (109 lb)   LMP 06/06/2021 (Exact Date)   SpO2 100%   Breastfeeding No   BMI 21.29 kg/m    Body mass index is 21.29 kg/m .     Physical Exam  Constitutional:       Appearance: Normal appearance. She is not ill-appearing.   Pulmonary:      Effort: Pulmonary effort is normal. No respiratory distress.   Abdominal:      General: There is no distension.   Neurological:      General: No focal deficit present.      Mental Status: She is alert. Mental status is at baseline.      Cranial Nerves: No cranial nerve deficit.      Motor: No  As we discussed:  No change in your medications today. Please continue to take your medications as instructed. weakness.   Psychiatric:         Mood and Affect: Mood normal.         Behavior: Behavior normal.        Results for orders placed or performed in visit on 07/16/21 (from the past 24 hour(s))   HCG qualitative urine   Result Value Ref Range    hCG Urine Qualitative Negative Negative       ----- Service Performed and Documented by Resident or Fellow ------

## 2023-02-02 ENCOUNTER — OFFICE VISIT (OUTPATIENT)
Dept: FAMILY MEDICINE | Facility: CLINIC | Age: 38
End: 2023-02-02
Payer: COMMERCIAL

## 2023-02-02 ENCOUNTER — ANCILLARY PROCEDURE (OUTPATIENT)
Dept: GENERAL RADIOLOGY | Facility: CLINIC | Age: 38
End: 2023-02-02
Attending: FAMILY MEDICINE
Payer: COMMERCIAL

## 2023-02-02 VITALS
SYSTOLIC BLOOD PRESSURE: 115 MMHG | OXYGEN SATURATION: 100 % | DIASTOLIC BLOOD PRESSURE: 82 MMHG | BODY MASS INDEX: 23.91 KG/M2 | RESPIRATION RATE: 16 BRPM | HEIGHT: 60 IN | HEART RATE: 102 BPM | WEIGHT: 121.8 LBS

## 2023-02-02 DIAGNOSIS — M54.2 NECK PAIN: ICD-10-CM

## 2023-02-02 DIAGNOSIS — M75.22 BICEPS TENDINITIS OF LEFT UPPER EXTREMITY: ICD-10-CM
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
## 2023-02-02 DIAGNOSIS — M67.912 TENDINOPATHY OF LEFT ROTATOR CUFF: ICD-10-CM
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
## 2023-02-02 PROCEDURE — 72040 X-RAY EXAM NECK SPINE 2-3 VW: CPT | Mod: FY | Performed by: RADIOLOGY

## 2023-02-02 PROCEDURE — 99213 OFFICE O/P EST LOW 20 MIN: CPT | Mod: GC | Performed by: STUDENT IN AN ORGANIZED HEALTH CARE EDUCATION/TRAINING PROGRAM

## 2023-02-02 RX ORDER — IBUPROFEN 600 MG/1
600 TABLET, FILM COATED ORAL EVERY 6 HOURS PRN
Qty: 90 TABLET | Refills: 0 | Status: SHIPPED | OUTPATIENT
Start: 2023-02-02 | End: 2023-03-20

## 2023-02-02 NOTE — PROGRESS NOTES
"  Assessment & Plan     Fall, initial encounter  Biceps tendinitis of left upper extremity  Tendinopathy of left rotator cuff  Neck pain  Initial encounter for fall 3 weeks ago. Exam reassuring against full-depth tear of rotator cuff. Xray of neck ordered to evaluate for bony abnormality given recent falls and positive Spurling test. Start physical therapy and use ibuprofen as well as Tylenol and ice for pain relief. If pain not improving after 6 weeks therapy will likely follow up with shoulder MRI and possible sports medicine vs ortho referral. Follow up sooner if pain worsens.   - Physical Therapy Referral  - ibuprofen (ADVIL/MOTRIN) 600 MG tablet  Dispense: 90 tablet; Refill: 0  - X-ray Cervical spine 2-3 vws        No follow-ups on file.    Faye Del Castillo MD  [facility] GAYLE Frye is a 38 year old, presenting for the following health issues:  Follow Up (Following up on from recent Fall. She would like a xray)      HPI     Fell January 8th on the ice at home. Fell on left side on outstretched arm. Having pain in the left shoulder and left wrist since then. Pain has not changed since initial injury. 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 When home from work notes shoulder is \"burning.\" Pain wakes her from sleep. Cannot lay on this side due to pain. Pain travels down the left arm. Pain also increases with lack of movement. Has never injured this shoulder in the past. Does note some neck pain but no mid or low back pain.     Has tried ice which is not helping much. Has not used much OTC medication \"I try to stay away from this.\"     Review of Systems   ROS otherwise negative if not stated in HPI        Objective    /82   Pulse 102   Resp 16   Ht 1.524 m (5')   Wt 55.2 kg (121 lb 12.8 oz)   SpO2 100%   BMI 23.79 kg/m    Body mass index is 23.79 kg/m .  Physical Exam   GENERAL: healthy, alert and no " distress  RESP: lungs clear to auscultation - no rales, rhonchi or wheezes  CV: regular rate and rhythm, normal S1 S2, no S3 or S4, no murmur, click or rub, no peripheral edema and peripheral pulses strong  MS: No pain with palpation of the cervical spine, pain present with palpation of the left trapezius   SKIN: no suspicious lesions or rashes  NEURO: Normal strength and tone, mentation intact and speech normal. Positive Spurling's sign  PSYCH: mentation appears normal, affect normal/bright    SHOULDER Exam-Left   Inspection: no swelling, no bruising, no discoloration, no obvious deformity, no asymmetry, no glenohumeral joint anterior bulge, no distal clavicle elevation, no muscle atrophy   Tenderness of: SC joint- no, clavicle(prox-mid)- no , clavicle-(mid-distal)- no , AC joint- YES, acromion- no , anterior capsule- no , prox bicep tendon- YES, greater tuberosity- no , prox humerus- no , supraspinatous- no , infraspinatous- no , superior trapezious- no , rhomboids- no    Range of Motion: Active- forward flexion- normal, abduction- 90 degrees with pain, external rotation- normal  Range of Motion: Passive- forward flexion- normal, abduction- normal, external rotation- normal, internal rotation- normal   Special tests: Neers- negative, Jaime(supraspinatous)- POSITIVE, cross arm adduction- POSITIVE, Yergasons- POSITIVE and positive spurlings        Results for orders placed or performed in visit on 02/02/23   X-ray Cervical spine 2-3 vws     Status: None    Narrative    EXAM: XR CERVICAL SPINE 2/3 VIEWS 2/2/2023 12:27 PM    HISTORY: Fall, initial encounter; Neck pain     COMPARISON: None    FINDINGS: AP, lateral, and odontoid views of the cervical spine were  obtained. Normal cervical spine alignment. Mild multilevel disc height  loss. Mild degenerative changes. No acute fracture or suspicious  osseous lesion. Lateral masses of C1 and C2 are well aligned and no  fractures seen on the odontoid view. No prevertebral  edema. No mass in  the visualized lung apices.      Impression    IMPRESSION:  Mild degenerative changes. No fracture seen.    I have personally reviewed the examination and initial interpretation  and I agree with the findings.    MICHELLE RATLIFF MD         SYSTEM ID:  [id number]

## 2023-02-02 NOTE — PATIENT INSTRUCTIONS
Patient Education   Here is the plan from today's visit    1. Fall, initial encounter  2. Biceps tendinitis of left upper extremity  3. Tendinopathy of left rotator cuff  4. Neck pain  You had a fall 3 weeks ago and pain is not improving. Your exam is reassuring against a full tear of any of the ligaments, but there may be a partial tear. We are going to get an xray of your neck to make sure the bones and spaces between look ok and then have you start going to physical therapy and taking ibuprofen for pain. Ok to still use ice as well or Tylenol. If your pain is not improving after 6 weeks of therapy please come back and we will then likely get an MRI of your shoulder.   - Physical Therapy Referral; Future  - ibuprofen (ADVIL/MOTRIN) 600 MG tablet; Take 1 tablet (600 mg) by mouth every 6 hours as needed for moderate pain (4-6) or inflammatory pain  Dispense: 90 tablet; Refill: 0  - X-ray Cervical spine 2-3 vws; Future        Please call or return to clinic if your symptoms don't go away.    Follow up plan  No follow-ups on file.    Thank you for coming to [facility] today.  Lab Testing:  **If you had lab testing today and your results are reassuring or normal they will be mailed to you or sent through [facility] within 7 days.   **If the lab tests need quick action we will call you with the results.  **If you are having labs done on a different day, please call [phone] to schedule at [facility] or [phone] for other [facility] Outpatient Lab locations. Labs do not offer walk-in appointments.  The phone number we will call with results is # [phone] (home) . If this is not the best number please call our clinic and change the number.  Medication Refills:  If you need any refills please call your pharmacy and they will contact us.   If you need to  your refill at a new pharmacy, please contact the new pharmacy directly. The new pharmacy will help you get your medications transferred  faster.   Scheduling:  If you have any concerns about today's visit or wish to schedule another appointment please call our office during normal business hours [phone] (8-5:00 M-F). If you can no longer make a scheduled visit, please cancel via [facility] or call us to cancel.   If a referral was made to an [facility] specialty provider and you do not get a call from central scheduling, please refer to directions on your visit summary or call our office during normal business hours for assistance.   If a Mammogram was ordered for you at the [facility] Center call [phone] to schedule or change your appointment.  If you had an XRay/CT/Ultrasound/MRI ordered the number is [phone] to schedule or change your radiology appointment.   [facility] has limited ultrasound appointments available on Wednesdays, if you would like your ultrasound at [facility], please call [phone] to schedule.   Medical Concerns:  If you have urgent medical concerns please call [phone] at any time of the day.    Faye Del Castillo MD

## 2023-02-28 ENCOUNTER — THERAPY VISIT (OUTPATIENT)
Dept: PHYSICAL THERAPY | Facility: CLINIC | Age: 38
End: 2023-02-28
Payer: COMMERCIAL

## 2023-02-28 DIAGNOSIS — M25.512 ACUTE PAIN OF LEFT SHOULDER: ICD-10-CM

## 2023-02-28 PROCEDURE — 97110 THERAPEUTIC EXERCISES: CPT | Mod: GP | Performed by: PHYSICAL THERAPIST

## 2023-02-28 PROCEDURE — 97162 PT EVAL MOD COMPLEX 30 MIN: CPT | Mod: GP | Performed by: PHYSICAL THERAPIST

## 2023-02-28 NOTE — PROGRESS NOTES
[facility]    OUTPATIENT Physical Therapy ORTHOPEDIC EVALUATION  PLAN OF TREATMENT FOR OUTPATIENT REHABILITATION  (COMPLETE FOR INITIAL CLAIMS ONLY)  Patient's Last Name, First Name, M.I.  YOB: 1985  Uday Stauffer    Provider s Name:  [facility]   Medical Record No.  [id number]   Start of Care Date:  02/28/23   Onset Date:   01/08/23   Treatment Diagnosis:  L shoulder pain Medical Diagnosis:  Acute pain of left shoulder       Goals:     02/28/23 0500   Body Part   Goals listed below are for L shoulder pain   Goal #1   Goal #1 reaching   Previous Functional Level No restrictions   Current Functional Level Cannot reach ;overhead;out to the side   Performance level 8/10 concordant L shoulder pain   STG Target Performance Reach ;overhead;out to the side   Performance level 6/10 concorant L shoulder pain   Rationale for independent personal hygiene;for dressing;for bathing;for meal preparation;for job requirements in their work place;for safe driving, hook seat belt, reach shift lever and turn signal, using both hands on steering wheel   Due date 03/28/23   LTG Target Performance Reach;overhead;out to the side   Performance Level 2/10 concordant L shoulder pain   Rationale for independent personal hygiene;for dressing;for bathing;for meal preparation;for job requirements in their work place   Due date 04/25/23       Therapy Frequency:  1x/week  Predicted Duration of Therapy Intervention:  8 weeks    Lyndsay Wisdom PT                 I CERTIFY THE NEED FOR THESE SERVICES FURNISHED UNDER        THIS PLAN OF TREATMENT AND WHILE UNDER MY CARE     (Physician attestation of this document indicates review and certification of the therapy plan).                     Certification Date From:  02/28/23   Certification Date To:  05/28/23    Referring Provider:  Rachelle ROGERS  Mathew    Initial Assessment        See Epic Evaluation SOC Date: 02/28/23

## 2023-02-28 NOTE — PROGRESS NOTES
Physical Therapy Initial Evaluation  Subjective:  Pt is a single mother and has to work.    The history is provided by the patient. No  was used.   Patient Health History  Uday Stauffer being seen for L shoulder pain.     Problem began: 1/8/2023.   Problem occurred: fall - landed on an outstretched arm   Pain is reported as 8/10 on pain scale.  General health as reported by patient is good.  Pertinent medical history includes: none.   Red flags:  None as reported by patient.  Medical allergies: none.   Surgeries include:  None.    Current medications:  None.    Current occupation is CNA.   Primary job tasks include:  Lifting/carrying.                  Therapist Generated HPI Evaluation         Type of problem:  Left shoulder.    This is a new condition.  Condition occurred with:  A fall.  Where condition occurred: at home.  Patient reports pain:  Anterior.  Pain is described as sharp and is intermittent.  Pain timing: just with activity.  Since onset symptoms are unchanged.  Associated symptoms:  Loss of motion/stiffness and loss of strength. Symptoms are exacerbated by using arm behind back, lying on extremity, using arm overhead, using arm at shoulder level and carrying  and relieved by NSAID's (tylenol).  Special tests included:  X-ray (x-ray of cervical spine).    Restrictions due to condition include:  Working in normal job without restrictions.  Barriers include:  None as reported by patient.                        Objective:  System                   Shoulder Evaluation:  ROM:  AROM:    Flexion:  Left:  76 (++)    Right:  160    Abduction:  Left: 95 (+)   Right:  180                Flexion/External Rotation:  Left:  To L ear (+)    Right:  T5  Extension/Internal Rotation:  Left:  T8 (tight)    Right:  T6          Strength:    Flexion: Left:3-/5    Pain: +    Right: 5/5     Pain:     Abduction:  Left: 3/5   Pain:+    Right: 5/5     Pain:    Internal Rotation:  Left:5/5     Pain:    Right:  5/5     Pain:  External Rotation:   Left:5/5     Pain:   Right:5/5     Pain:        Elbow Flexion:  Left:5/5     Pain:    Right:5/5     Pain:                                             General     ROS    Assessment/Plan:    Patient is a 38 year old female with left side shoulder complaints.    Patient has the following significant findings with corresponding treatment plan.                Diagnosis 1:  L shoulder pain  Pain -  hot/cold therapy, directional preference exercise and home program  Decreased ROM/flexibility - manual therapy, therapeutic exercise and home program  Decreased strength - therapeutic exercise, therapeutic activities and home program  Impaired muscle performance - neuro re-education and home program  Decreased function - therapeutic activities and home program    Therapy Evaluation Codes:   1) History comprised of:   Personal factors that impact the plan of care:      Language, Profession and Work status.    Comorbidity factors that impact the plan of care are:      None.     Medications impacting care: none.  2) Examination of Body Systems comprised of:   Body structures and functions that impact the plan of care:      Shoulder.   Activity limitations that impact the plan of care are:      Bathing, Dressing, Lifting, Working, Sleeping and reaching.  3) Clinical presentation characteristics are:   Evolving/Changing.  4) Decision-Making    Moderate complexity using standardized patient assessment instrument and/or measureable assessment of functional outcome.  Cumulative Therapy Evaluation is: Moderate complexity.    Previous and current functional limitations:  (See Goal Flow Sheet for this information)    Short term and Long term goals: (See Goal Flow Sheet for this information)     Communication ability:  Patient appears to be able to clearly communicate and understand verbal and written communication and follow directions correctly.  Treatment Explanation - The following has been discussed  with the patient:   RX ordered/plan of care  Anticipated outcomes  Possible risks and side effects  This patient would benefit from PT intervention to resume normal activities.   Rehab potential is good.    Frequency:  1 X week, once daily  Duration:  for 8 weeks  Discharge Plan:  Achieve all LTG.  Independent in home treatment program.  Reach maximal therapeutic benefit.    Please refer to the daily flowsheet for treatment today, total treatment time and time spent performing 1:1 timed codes.

## 2023-03-20 ENCOUNTER — OFFICE VISIT (OUTPATIENT)
Dept: FAMILY MEDICINE | Facility: CLINIC | Age: 38
End: 2023-03-20
Payer: COMMERCIAL

## 2023-03-20 VITALS
WEIGHT: 122 LBS | HEART RATE: 67 BPM | OXYGEN SATURATION: 100 % | SYSTOLIC BLOOD PRESSURE: 134 MMHG | RESPIRATION RATE: 18 BRPM | DIASTOLIC BLOOD PRESSURE: 76 MMHG | BODY MASS INDEX: 23.95 KG/M2 | HEIGHT: 60 IN

## 2023-03-20 DIAGNOSIS — O46.90 VAGINAL BLEEDING IN PREGNANCY: Primary | ICD-10-CM

## 2023-03-20 PROCEDURE — 99214 OFFICE O/P EST MOD 30 MIN: CPT | Mod: GC | Performed by: STUDENT IN AN ORGANIZED HEALTH CARE EDUCATION/TRAINING PROGRAM

## 2023-03-20 NOTE — PROGRESS NOTES
"  Assessment & Plan     Vaginal bleeding in pregnancy  Pt reports FDLMP was February 3, 2023 and had resulting positive at home pregnancy test February 29th. Declines urine pregnancy test here. Pt noted spotting, enough to \"soak\" underwear that started 2 days ago, she is concerned about miscarriage. Pregnancy is unplanned though desired. Is okay with being pregnant or continuing pregnancy in the next year. Discussed getting beta-hcg quant today so we can track today and in 72 hours to see if still continuing with viable pregnancy. Pt declined as she just wanted Ultrasound and no lab work. At this point, either pt has 1) viable pregnancy with implantation spotting 2) ongoing vs. Complete miscarriage 3) pregnancy of unknown location. She is not nauseas, vomiting, no abdominal pain with normal abdominal exam so ectopic is certainly less likely. Discussed at length the benefit of getting beta-hcg today because if the US does not show a pregnancy in the uterus we are not sure if she had a complete miscarriage vs if it is a pregnancy of known location and having repeat beta-hcgs would help differentiate the situation. Discussed risk of ectopic pregnancy including hemorrhage and death. Pt would like to only proceed with US at this time, so will proceed in that manner.   - US OB <14 WKS SINGLE OR FIRST GESTATION (IN CLINIC)  - Pt declined urine pregnancy test and beta-hcg pregnancy test   - If pregnancy not located in uterus will need serial beta-hcgs to confirm miscarriage and not pregnancy of unknown location   - If viable pregnancy, pt will be following up with prenatal clinic in [place]     Ordering of each unique test    Return in about 2 years (around 3/20/2025) for Follow up, with me.    Zahraa Hunter,   Family Medicine PGY-3  [facility]   Pronouns: She/Hers    Subjective   Uday is a 38 year old presenting for the following health issues:  Pregnancy Test (Patient says she " has some spotting)      HPI     Uday is a38 year old Woman, [id number] presents requesting an ultrasound as she is concerned she is having a miscarriage.      positive pregnancy test   was FDLMP  Was having trouble periods     11/10/2023   6w3d    Has a 19 year old, the G1   No issues with that pregnancy     Noticing spotting 2 days ago  Soaking underwear  Wondering if still pregnant or not  Was fine with being pregnant    Has had a few miscarriages after 7 yo 8 weeks, but didn't had spotting   Maybe a couple hard to know     Last unprotected intercourse      Okay with being pregnant in the next year    Symptoms of pregnancy: has noticed breast tenderness and nausea   Has breast tenderness during periods    Review of Systems   Constitutional, HEENT, cardiovascular, pulmonary, gi and gu systems are negative, except as otherwise noted.      Objective    /76   Pulse 67   Resp 18   Ht 1.524 m (5')   Wt 55.3 kg (122 lb)   LMP 2023   SpO2 100%   BMI 23.83 kg/m    Body mass index is 23.83 kg/m .     Physical Exam   General: Alert and oriented, in no acute distress.  Skin: Warm and dry, no abnormalities noted.  Eyes: Extra-ocular muscles grossly intact, pupils equal.  ENT: Speech intact, nasal passages open, no hearing impairment noted.  CV: S1 S2 RRR. No cyanosis or pallor, warm and well perfused.  Respiratory: CTAb. No w/r/r. No respiratory distress, no accessory muscle use.  Abdomen: Soft, non-tender. No rebound or guarding.   Psychiatric: Mood and affect appear normal.   Extremities: Warm, able to move all four extremities at will.                   Private car negative...

## 2024-01-25 ENCOUNTER — OFFICE VISIT (OUTPATIENT)
Dept: FAMILY MEDICINE | Facility: CLINIC | Age: 39
End: 2024-01-25
Payer: COMMERCIAL

## 2024-01-25 VITALS
OXYGEN SATURATION: 98 % | BODY MASS INDEX: 26.25 KG/M2 | HEART RATE: 59 BPM | RESPIRATION RATE: 16 BRPM | HEIGHT: 60 IN | TEMPERATURE: 98.2 F | WEIGHT: 133.7 LBS

## 2024-01-25 DIAGNOSIS — Z11.3 ROUTINE SCREENING FOR STI (SEXUALLY TRANSMITTED INFECTION): ICD-10-CM

## 2024-01-25 DIAGNOSIS — Z11.3 SCREENING FOR STDS (SEXUALLY TRANSMITTED DISEASES): ICD-10-CM

## 2024-01-25 DIAGNOSIS — R31.0 GROSS HEMATURIA: ICD-10-CM

## 2024-01-25 DIAGNOSIS — R30.0 DYSURIA: Primary | ICD-10-CM

## 2024-01-25 DIAGNOSIS — Z12.4 CERVICAL CANCER SCREENING: ICD-10-CM

## 2024-01-25 LAB
ALBUMIN UR-MCNC: NEGATIVE MG/DL
APPEARANCE UR: CLEAR
BACTERIA #/AREA URNS HPF: NORMAL /HPF
BILIRUB UR QL STRIP: NEGATIVE
CLUE CELLS: ABNORMAL
COLOR UR AUTO: YELLOW
GLUCOSE UR STRIP-MCNC: NEGATIVE MG/DL
HCG UR QL: NEGATIVE
HGB UR QL STRIP: ABNORMAL
KETONES UR STRIP-MCNC: NEGATIVE MG/DL
LEUKOCYTE ESTERASE UR QL STRIP: ABNORMAL
NITRATE UR QL: NEGATIVE
PH UR STRIP: 6 [PH] (ref 5–8)
RBC #/AREA URNS AUTO: NORMAL /HPF
SP GR UR STRIP: 1.02 (ref 1–1.03)
SQUAMOUS #/AREA URNS AUTO: NORMAL /LPF
TRICHOMONAS, WET PREP: ABNORMAL
UROBILINOGEN UR STRIP-ACNC: 0.2 E.U./DL
WBC #/AREA URNS AUTO: NORMAL /HPF
WBC'S/HIGH POWER FIELD, WET PREP: ABNORMAL
YEAST, WET PREP: ABNORMAL

## 2024-01-25 PROCEDURE — 87491 CHLMYD TRACH DNA AMP PROBE: CPT

## 2024-01-25 PROCEDURE — 87591 N.GONORRHOEAE DNA AMP PROB: CPT

## 2024-01-25 PROCEDURE — 88175 CYTOPATH C/V AUTO FLUID REDO: CPT | Mod: XU

## 2024-01-25 PROCEDURE — 87210 SMEAR WET MOUNT SALINE/INK: CPT

## 2024-01-25 PROCEDURE — 99214 OFFICE O/P EST MOD 30 MIN: CPT | Mod: GC

## 2024-01-25 PROCEDURE — 81001 URINALYSIS AUTO W/SCOPE: CPT

## 2024-01-25 PROCEDURE — 87624 HPV HI-RISK TYP POOLED RSLT: CPT

## 2024-01-25 PROCEDURE — 81025 URINE PREGNANCY TEST: CPT

## 2024-01-25 PROCEDURE — 88112 CYTOPATH CELL ENHANCE TECH: CPT | Performed by: PATHOLOGY

## 2024-01-25 ASSESSMENT — PAIN SCALES - GENERAL: PAINLEVEL: NO PAIN (0)

## 2024-01-25 NOTE — COMMUNITY RESOURCES LIST (ENGLISH)
01/25/2024   [facility]  N/A  For questions about this resource list or additional care needs, please contact your primary care clinic or care manager.  Phone: [phone]   Email: N/A   Address: [address] [place] [address]   Hours: N/A        Financial Stability       Rent and mortgage payment assistance  1  People Reaching Out to Other People (PROP) Distance: 0.84 miles      In-Person, Phone/Virtual   [address] [place] [address]  Language: English, Samoan  Hours: Mon - Tue 9:30 AM - 1:00 PM , Wed 3:00 PM - 6:30 PM , Thu - Fri 9:30 AM - 1:00 PM  Fees: Free   Phone: (498) 248-8926 Email: prop@[employer].Quorum Website: http://www.Health[employer] Holdings.org     2  [facility] Options for Women Distance: 2.42 miles      In-Person, Phone/Virtual   [address] [place] [address]  Language: English, Samoan  Hours: Mon 11:00 AM - 5:00 PM , Tue 10:00 AM - 6:00 PM , Wed 11:00 AM - 5:00 PM , Fri 11:00 AM - 5:00 PM  Fees: Free   Phone: (667) 320-4532 Email: help@[employer].org Website: http://[facility].org/     Utility payment assistance  3  People Reaching Out to Other People (PROP) Distance: 0.84 miles      In-Person, Phone/Virtual   [address] [place] [address]  Language: English, Samoan  Hours: Mon - Tue 9:30 AM - 1:00 PM , Wed 3:00 PM - 6:30 PM , Thu - Fri 9:30 AM - 1:00 PM  Fees: Free   Phone: (442) 596-9218 Email: prop@[employer]org Website: http://www.[employer]Smart [employer].org     4  [facility] (ICA) - [employer] - Homelessness Prevention - Utility payment assistance Distance: 3.28 miles      In-Person, Phone/Virtual   [address] [place] [address]  Language: English, Sudanese, Samoan  Hours: Mon 12:00 AM - 7:00 PM , Tue 10:00 AM - 3:00 PM , Wed - Thu 10:00 AM - 2:30 PM  Fees: Free   Phone: (842) 721-4563 Email: amie@[employer].Quorum Website: http://www.threadsyf.org          Food and Nutrition       Food  pantry  5  People Reaching Out to Other People (PROP) Distance: 0.84 miles      Pickup   [address] [place] [address]  Language: English, Grenadian  Hours: Mon - Tue 9:30 AM - 1:00 PM , Wed 3:00 PM - 6:30 PM , Thu - Fri 9:30 AM - 1:00 PM  Fees: Free   Phone: (179) 154-7434 Email: prop@[employer].org Website: http://www.Cumulocity.FiberLight     6  [facility] Association (ICA) - K-Tel - Emergency Bags Distance: 3.28 miles      In-Person, Pickup   [address] [place] [address]  Language: English, Bangladeshi, Grenadian  Hours: Mon 12:00 AM - 7:00 PM , Tue 10:00 AM - 3:00 PM , Wed - Thu 10:00 AM - 2:30 PM  Fees: Free   Phone: (524) 613-3957 Email: ica@[employer].org Website: http://www.Applitools.org     SNAP application assistance  7  People Reaching Out to Other People (PROP) Distance: 0.84 miles      In-Person, Phone/Virtual   [address] [place] [address]  Language: English, Grenadian  Hours: Mon - Tue 9:30 AM - 1:00 PM , Wed 3:00 PM - 6:30 PM , Thu - Fri 9:30 AM - 1:00 PM  Fees: Free   Phone: (234) 716-2925 Email: prop@[employer].org Website: http://www.Cumulocity.org     8  [facility] Distance: 9.2 miles      In-Person, Phone/Virtual   [address] [place] [address]  Language: English, Grenadian  Hours: Mon - Fri 9:00 AM - 4:30 PM  Fees: Free   Phone: (841) 352-8286 Ext.113 Email: info@Sayah.org Website: https://Sayah.org     Soup kitchen or free meals  9  [facility] Distance: 4.12 miles      Pickup   [address] [place] [address]  Language: English  Hours: Mon - Tue 5:30 PM - 6:30 PM , Sat - Sun 5:30 PM - 6:30 PM  Fees: Free   Phone: (813) 380-3920 Email: office@[employer] Website: https://www.Skoodat.org     [address] [place] [address] [employer]ishes Distance: 7.88 miles      [address] [address]  Language: English  Hours: Sat 5:00  PM - 7:00 PM , Sun 5:30 PM - 6:30 PM  Fees: Free   Phone: (928) 438-7063 Email: office@[facility] Website: http://[facility]irminghamNetrada/          Hotlines and Helplines       Hotline - Housing crisis  11  [facility] Distance: 10.17 miles      Phone/Virtual   [address] [place] [address]  Language: English  Hours: Mon - Sun Open 24 Hours   Phone: (224) 123-4446 Email: info@[facility].[employer] Website: http://www.Yozio.Winkapp     12  [facility] (Main Office) Distance: 10.18 miles      Phone/Virtual   [address] [place] [address]  Language: English  Hours: Mon - Sun Open 24 Hours   Phone: (958) 700-6851 Email: info@Yozio.Winkapp Website: http://Yozio.Winkapp          Housing       Coordinated Entry access point  13  [facility] ([facility] - Housing Services Distance: 11.39 miles      In-Person   [address] [place] [address]  Language: English  Hours: Mon - Fri 9:00 AM - 5:00 PM  Fees: Free   Phone: (692) 898-9630 Email: housing@[facility].org Website: http://www.[facility].org/housing     14  [facility] (ASC) Distance: 11.42 miles      In-Person, Phone/Virtual   [address] [place] [address]  Language: English, Bengali  Hours: Mon - Fri 10:00 AM - 5:30 PM , Mon - Sun 7:30 PM - 10:20 PM , Sat - Sun 1:00 PM - 5:30 PM  Fees: Free   Phone: (324) 422-2109 Email: info@[employer].org Website: https://www.Edicy.org/our-programs/adult-shelter-connect-bond-shelter/     Drop-in center or day shelter  15  [facility] Distance: 11.51 miles      In-Person   [address] [place] [address]  Language: English  Hours: Mon - Fri 12:00 PM - 3:00 PM  Fees: Free   Phone: (794) 287-7970 Email: jordanaPaystik@[employer].com Website: http://Smeet.org/     16  [facility] Distance: 11.69 miles      In-Person   [address] [place] [address]  Language: English,  Hmong, Citizen of Kirib[place], Tajik  Hours: Mon - Thu 8:30 AM - 4:30 PM , Sat - Sun 9:00 AM - 12:00 PM  Fees: Free   Phone: (157) 720-5861 Email: info@[employer] Givers.[employer]x Website: https://Disability Care Givers.org/     Housing search assistance  17  [employer] Distance: 8.23 miles      In-Person   [address] [place] [address]  Language: English  Hours: Mon - Fri 8:00 AM - 5:00 PM  Fees: Insurance, Self Pay   Phone: (946) 395-8933 Email: Jacob@[employer] Website: http://www.SulfurCell/     18  [facility] [employer] - Rental Homes for Future Homebuyers Program Distance: 8.78 miles      Phone/Virtual   [address] [place] [address]  Language: English  Hours: Mon - Fri 8:00 AM - 4:30 PM  Fees: Free   Phone: (169) 959-3740 Email: hra@[facility] Website: https://www.[facility]/hra/[place]-housing-and-xchauttxrlcdh-rrxsrfixz-hjq     Shelter for individuals  19  [facility] Distance: 11.58 miles      In-Person   [address] [place] [address]  Language: English  Hours: Mon - Fri 4:00 PM - 9:00 AM  Fees: Free   Phone: (642) 876-2008 Email: joão@[facility].org Website: https://centralusa.[facility].org/northern/[facility]/     20  [facility] Distance: 11.6 miles      In-Person   [address] [place] [address]  Language: English  Hours: Mon - Sun Open 24 Hours  Fees: Free   Phone: (102) 146-3805 Email: communications@oscs-mn.org Website: https://oscs-mn.org/oursaviourshousing/          Transportation       Free or low-cost transportation  21  [facility] Distance: 10.83 miles      In-Person   [address] [place] [address]  Language: English  Hours: Mon - Fri 9:00 AM - 12:00 PM , Mon - Fri 1:00 PM - 3:00 PM  Fees: Self Pay   Phone: (513) 482-6416 Email: info@[employer].Source MDx Website: https://www.[facility].org/[place]     22  The  [facility] - Bus Passes - Free or low-cost transportation Distance: 11.11 miles      In-Person   [address] [place] [address]  Language: English  Hours: Tue 9:30 AM - 11:30 AM , Thu 9:30 AM - 11:30 AM  Fees: Free   Phone: (335) 527-7273 Email: info@mario.[employer] Website: http://www.marioGlobal Care Quest/     Transportation to medical appointments  23  [facility] Transportation Distance: 11.34 miles      [address] [place] [address]  Language: English  Hours: Mon - Fri 6:30 AM - 4:30 PM  Fees: Insurance, Self Pay   Phone: (848) 256-9189 Email: ChyfgedrxWyzdrxbczeowil59@[employer] Website: https://[facility] HospitalQnekt     24  [employer] Distance: 12.13 miles      In-Person   [address] [place] [address]  Language: Greek, English, Oromo, Solomon Islander  Hours: Mon - Sat 5:00 AM - 9:00 PM  Fees: Insurance, Self Pay   Phone: (719) 717-7986 Email: info@EpicPledge Website: http://www.Bagel Nash.Mountain Alarm/          Important Numbers & Websites       Emergency Services   911  City Services   311  Poison Control   (494) 386-9925  Suicide Prevention Lifeline   (906) 810-2353 (TALK)  Child Abuse Hotline   (462) 373-6803 (4-A-Child)  Sexual Assault Hotline   (491) 755-7012 (HOPE)  National Runaway Safeline   (774) 200-5712 (RUNAWAY)  All-Options Talkline   (360) 174-8528  Substance Abuse Referral   (796) 971-7854 (HELP)

## 2024-01-25 NOTE — LETTER
January 30, 2024      Uday Stauffer  [address]  [place] [address]        Dear ,    We are writing to inform you of your test results.    All your testing was negative, which is great! Your pap smear was negative and your next one will be due in     Resulted Orders   UA Macroscopic with reflex to Microscopic and Culture   Result Value Ref Range    Color Urine Yellow Colorless, Straw, Light Yellow, Yellow    Appearance Urine Clear Clear    Glucose Urine Negative Negative mg/dL    Bilirubin Urine Negative Negative    Ketones Urine Negative Negative mg/dL    Specific Gravity Urine 1.025 1.005 - 1.030    Blood Urine Small (A) Negative    pH Urine 6.0 5.0 - 8.0    Protein Albumin Urine Negative Negative mg/dL    Urobilinogen Urine 0.2 0.2, 1.0 E.U./dL    Nitrite Urine Negative Negative    Leukocyte Esterase Urine Trace (A) Negative   HCG qualitative urine   Result Value Ref Range    hCG Urine Qualitative Negative Negative      Comment:      This test is for screening purposes.  Results should be interpreted along with the clinical picture.  Confirmation testing is available if warranted by ordering OXH640, HCG Quantitative Pregnancy.   UA Microscopic with Reflex to Culture   Result Value Ref Range    Bacteria Urine None Seen None Seen /HPF    RBC Urine 0-2 0-2 /HPF /HPF    WBC Urine 0-5 0-5 /HPF /HPF    Squamous Epithelials Urine None Seen None Seen /LPF    Narrative    Urine Culture not indicated   Neisseria gonorrhoeae PCR   Result Value Ref Range    Neisseria gonorrhoeae Negative Negative      Comment:      Negative for N. gonorrhoeae rRNA by transcription mediated amplification. A negative result by transcription mediated amplification does not preclude the presence of C. trachomatis infection because results are dependent on proper and adequate collection, absence of inhibitors and sufficient rRNA to be detected.   Chlamydia trachomatis PCR   Result Value Ref Range    Chlamydia trachomatis  Negative Negative      Comment:      A negative result by transcription mediated amplification does not preclude the presence of C. trachomatis infection because results are dependent on proper and adequate collection, absence of inhibitors and sufficient rRNA to be detected.   Wet Prep   Result Value Ref Range    Trichomonas Absent Absent    Yeast Absent Absent    Clue Cells Absent Absent    WBCs/high power field 1+ (A) None   Pap screen with HPV - recommended age 30 - 65 years   Result Value Ref Range    Interpretation        Negative for Intraepithelial Lesion or Malignancy (NILM)    Comment         Papanicolaou Test Limitations:  Cervical cytology is a screening test with limited sensitivity, and regular screening is critical for cancer prevention.  Pap tests are primarily effective for the diagnosis/prevention of squamous cell carcinoma, not adenocarcinoma or other cancers.        Specimen Adequacy       Satisfactory for evaluation, endocervical/transformation zone component present    Clinical Information       irregular bleeding      Reflex Testing Yes regardless of result     Previous Abnormal?       No      Performing Labs       The technical component of this testing was completed at [facility]     Cytology non gyn   Result Value Ref Range    Final Diagnosis       Specimen A     Interpretation:      Negative for High Grade Urothelial Carcinoma.     Adequacy:     Satisfactory for evaluation.      Clinical Information       Painless hematuria.      Gross Description       A(A). Urine, Clean Catch, Urine Cytology:  Received 30 ml of hazy, yellow fluid, processed as one Pap stained Autocyte.      Microscopic Description       Microscopic examination was performed.        Performing Labs       The technical component of this testing was completed at [facility]         If you  have any questions or concerns, please call the clinic at the number listed above.       Sincerely,      Ludmila Patiño MD

## 2024-01-25 NOTE — PROGRESS NOTES
Assessment & Plan     Gross hematuria vs other  source of red blood on TP  Dysuria  STI screening  6 days of hematuria (bright red blood on TP) and pain with urination. Further endorses some different odor to vaginal discharge. No flank or back pain. No fevers, chills, N/V, diarrhea, or other systemic symptoms. Differential includes UTI, nephrolithiasis, vaginal infection, endometrial bleeding. Rectal bleeding less likely based on description but rectal exam not performed today. Pelvic exam revealed no abnormalities, cervical mucosa healthy appearing, some mild friability with cytobrush. Will do general workup, RTC if workup negative and bleeding persists.  - Cytology non gyn  - UA Microscopic with Reflex to Culture  - HCG qualitative urine; Future  - Neisseria gonorrhoeae PCR  - Chlamydia trachomatis PCR  - Wet Prep    Cervical cancer screening  Due for routine pap  - Pap screen with HPV - recommended age 30 - 65 years    RTC if symptoms persis.    Sandra Frye is a 39 year old, presenting for the following health issues:  Pain (Pain and bleeding with urination)      1/25/2024    11:37 AM   Additional Questions   Roomed by Luly     HPI     A week of pain with urination and blood in urine  No back pain, no fever  No frequency  Some vaginal discharge with increased odor, no change in color  No pain with intercourse  LMP 3 weeks ago  No n/v, no diarrhea, no rashes        Objective    Pulse 59   Temp 98.2  F (36.8  C)   Resp 16   Ht 1.524 m (5')   Wt 60.6 kg (133 lb 11.2 oz)   SpO2 98%   BMI 26.11 kg/m    Body mass index is 26.11 kg/m .  Physical Exam   Constitutional: awake, alert, cooperative, no apparent distress, and appears stated age  Eyes: Lids and lashes normal, pupils equal, round and reactive to light, extra ocular muscles intact, sclera clear, conjunctiva normal  ENT: normocepalic, without obvious abnormality  Hematologic / Lymphatic: no cervical lymphadenopathy and no supraclavicular  lymphadenopathy  Respiratory: No increased work of breathing, good air exchange, clear to auscultation bilaterally, no crackles or wheezing  Cardiovascular: Normal apical impulse, regular rate and rhythm, normal S1 and S2, no S3 or S4, and no murmur noted  GI: soft, non-distended and non-tender  Musculoskeletal: no lower extremity pitting edema present  full range of motion noted  tone is normal  Pelvic exam: Normally developed genitalia with no external lesions or eruptions. Vagina and cervix show no lesions, inflammation, discharge or tenderness. No cystocele, No rectocele. No uterine masses palpated. No adnexal mass or tenderness.          Signed Electronically by: Jennifer Reese MD

## 2024-01-26 LAB
C TRACH DNA SPEC QL NAA+PROBE: NEGATIVE
N GONORRHOEA DNA SPEC QL NAA+PROBE: NEGATIVE
PATH REPORT.COMMENTS IMP SPEC: NORMAL
PATH REPORT.FINAL DX SPEC: NORMAL
PATH REPORT.GROSS SPEC: NORMAL
PATH REPORT.MICROSCOPIC SPEC OTHER STN: NORMAL
PATH REPORT.RELEVANT HX SPEC: NORMAL

## 2024-01-29 NOTE — PATIENT INSTRUCTIONS
Patient Education   Here is the plan from today's visit    1. Painless hematuria  - Cytology non gyn    2. Dysuria  - UA Macroscopic with reflex to Microscopic and Culture; Future    3. Gross hematuria  - UA Macroscopic with reflex to Microscopic and Culture; Future  - HCG qualitative urine; Future    4. Cervical cancer screening  - Pap screen with HPV - recommended age 30 - 65 years  - HPV Hold (Lab Only)    5. Routine screening for STI (sexually transmitted infection)  - Neisseria gonorrhoeae PCR  - Chlamydia trachomatis PCR  - Wet Prep    Please call or return to clinic if your symptoms don't go away.    Follow up plan  Return if symptoms worsen or fail to improve.    Thank you for coming to [facility] today.  Lab Testing:  **If you had lab testing today and your results are reassuring or normal they will be mailed to you or sent through [employer] within 7 days.   **If the lab tests need quick action we will call you with the results.  **If you are having labs done on a different day, please call [phone] to schedule at [facility] or [phone] for other [facility] locations. Labs do not offer walk-in appointments.  The phone number we will call with results is # [phone] (home) . If this is not the best number please call our clinic and change the number.  Medication Refills:  If you need any refills please call your pharmacy and they will contact us.   If you need to  your refill at a new pharmacy, please contact the new pharmacy directly. The new pharmacy will help you get your medications transferred faster.   Scheduling:  If you have any concerns about today's visit or wish to schedule another appointment please call our office during normal business hours [phone] (8-5:00 M-F). If you can no longer make a scheduled visit, please cancel via [employer] or call us to cancel.   If a referral was made to an [facility] specialty provider and you do not get a call  from central scheduling, please refer to directions on your visit summary or call our office during normal business hours for assistance.   If a Mammogram was ordered for you at the [facility] call [phone] to schedule or change your appointment.  If you had an XRay/CT/Ultrasound/MRI ordered the number is [phone] to schedule or change your radiology appointment.   [facility] has limited ultrasound appointments available on Wednesdays, if you would like your ultrasound at [facility], please call [phone] to schedule.   Medical Concerns:  If you have urgent medical concerns please call [phone] at any time of the day.    Jennifer Reese MD

## 2024-01-30 LAB
BKR LAB AP GYN ADEQUACY: NORMAL
BKR LAB AP GYN INTERPRETATION: NORMAL
BKR LAB AP HPV REFLEX: NORMAL
BKR LAB AP PREVIOUS ABNORMAL: NORMAL
PATH REPORT.COMMENTS IMP SPEC: NORMAL
PATH REPORT.COMMENTS IMP SPEC: NORMAL
PATH REPORT.RELEVANT HX SPEC: NORMAL

## 2024-02-01 LAB
HUMAN PAPILLOMA VIRUS 16 DNA: NEGATIVE
HUMAN PAPILLOMA VIRUS 18 DNA: NEGATIVE
HUMAN PAPILLOMA VIRUS FINAL DIAGNOSIS: NORMAL
HUMAN PAPILLOMA VIRUS OTHER HR: NEGATIVE

## 2024-02-05 ENCOUNTER — TELEPHONE (OUTPATIENT)
Dept: FAMILY MEDICINE | Facility: CLINIC | Age: 39
End: 2024-02-05
Payer: COMMERCIAL

## 2024-02-05 NOTE — TELEPHONE ENCOUNTER
Left voicemail with results and requested patient call back to schedule an appointment if still seeing blood in urine.   Kavita Borjas RN

## 2024-02-05 NOTE — TELEPHONE ENCOUNTER
----- Message from Jennifer Reese MD sent at 2/5/2024  8:26 AM CST -----  Hi, can you please call patient and let her know all testing was normal? Her pap smear was normal and she will next be due in 5 years. Can you ask her if she is still seeing blood in her urine? If so, can we get her scheduled for a follow up visit with me? Thanks, Jennifer

## 2024-04-08 ENCOUNTER — OFFICE VISIT (OUTPATIENT)
Dept: FAMILY MEDICINE | Facility: CLINIC | Age: 39
End: 2024-04-08
Payer: COMMERCIAL

## 2024-04-08 VITALS
WEIGHT: 132 LBS | DIASTOLIC BLOOD PRESSURE: 80 MMHG | OXYGEN SATURATION: 99 % | HEIGHT: 60 IN | TEMPERATURE: 98 F | BODY MASS INDEX: 25.91 KG/M2 | RESPIRATION RATE: 16 BRPM | HEART RATE: 52 BPM | SYSTOLIC BLOOD PRESSURE: 117 MMHG

## 2024-04-08 DIAGNOSIS — R31.0 GROSS HEMATURIA: Primary | ICD-10-CM

## 2024-04-08 DIAGNOSIS — Z13.220 SCREENING FOR HYPERLIPIDEMIA: ICD-10-CM

## 2024-04-08 LAB
ANION GAP SERPL CALCULATED.3IONS-SCNC: 12 MMOL/L (ref 7–15)
BUN SERPL-MCNC: 11.6 MG/DL (ref 6–20)
CALCIUM SERPL-MCNC: 9.9 MG/DL (ref 8.6–10)
CHLORIDE SERPL-SCNC: 104 MMOL/L (ref 98–107)
CHOLEST SERPL-MCNC: 188 MG/DL
CREAT SERPL-MCNC: 0.44 MG/DL (ref 0.51–0.95)
DEPRECATED HCO3 PLAS-SCNC: 24 MMOL/L (ref 22–29)
EGFRCR SERPLBLD CKD-EPI 2021: >90 ML/MIN/1.73M2
FASTING STATUS PATIENT QL REPORTED: NO
GLUCOSE SERPL-MCNC: 100 MG/DL (ref 70–99)
HDLC SERPL-MCNC: 53 MG/DL
LDLC SERPL CALC-MCNC: 120 MG/DL
NONHDLC SERPL-MCNC: 135 MG/DL
POTASSIUM SERPL-SCNC: 4.1 MMOL/L (ref 3.4–5.3)
SODIUM SERPL-SCNC: 140 MMOL/L (ref 135–145)
TRIGL SERPL-MCNC: 75 MG/DL

## 2024-04-08 PROCEDURE — 36415 COLL VENOUS BLD VENIPUNCTURE: CPT

## 2024-04-08 PROCEDURE — 80048 BASIC METABOLIC PNL TOTAL CA: CPT

## 2024-04-08 PROCEDURE — 80061 LIPID PANEL: CPT

## 2024-04-08 PROCEDURE — 99214 OFFICE O/P EST MOD 30 MIN: CPT | Mod: GC

## 2024-04-08 NOTE — PROGRESS NOTES
Assessment & Plan     Gross hematuria  Kidney function appears to be normal, although can consider Cystatin C given low creatinine. Work up for STD/STI, yeast, bacterial vaginosis, trichomoniasis, pregnancy, UTI, urothelial carcinoma were all negative on 01/25/2024. Imaging from [facility] cannot be accessed. CT abdomen/pelvis in 2023 shows right ovarian cyst but no renal cysts. There was a small, right-sided non-obstructive renal stone. Otherwise, physical examination was normal, with no CVA tenderness to suggest pyelonephritis. History and physical exam do not support obstructive kidney stone. Persistent trace blood could potentially be consistent with renal artery stenosis; however, kidney function and blood pressure are both within normal limits. Will repeat renal ultrasound to see if stone has become larger or if it has resolved. Urology referral placed as well.  - Basic metabolic panel; Future  - Adult Urology  Referral; Future  - US Renal Complete w Arterial Duplex; Future  - Basic metabolic panel    Screening for hyperlipidemia  Care gap requirement.  - Lipid panel reflex to direct LDL Non-fasting; Future  - Lipid panel reflex to direct LDL Non-fasting    BMI  Estimated body mass index is 25.78 kg/m  as calculated from the following:    Height as of this encounter: 1.524 m (5').    Weight as of this encounter: 59.9 kg (132 lb).     Return in about 4 weeks (around 5/6/2024).    Sandra Frye is a 39 year old, presenting for the following health issues:  Clinic Care Coordination - Follow-up (Past lab and pap testing) and Referral (Specialist for concerns )      4/8/2024     8:36 AM   Additional Questions   Roomed by Dipti   Accompanied by self         4/8/2024    Information    services provided? No     HPI   - Presents to the clinic for follow up of hematuria  - Intermittent pain with urination near the end of voiding  - Intermittent hematuria that began towards the end of  "November   - Saw blood in her urine almost every day last week  - Never happened before  - No family history of kidney or urinary issues  - No hematochezia   - Went to [facility] for workup of symptoms and was told that she had \"a cyst in her kidney\"  - LMP 3/23/24    Review of Systems  Constitutional, neuro, ENT, endocrine, pulmonary, cardiac, gastrointestinal, genitourinary, musculoskeletal, integument and psychiatric systems are negative, except as otherwise noted.      Objective    /80   Pulse 52   Temp 98  F (36.7  C) (Oral)   Resp 16   Ht 1.524 m (5')   Wt 59.9 kg (132 lb)   LMP 03/23/2024 (Approximate)   SpO2 99%   Breastfeeding Unknown   BMI 25.78 kg/m    Body mass index is 25.78 kg/m .    Physical Exam  Vitals reviewed.   Constitutional:       Appearance: Normal appearance.   HENT:      Head: Normocephalic and atraumatic.      Nose: Nose normal.      Mouth/Throat:      Mouth: Mucous membranes are moist.      Pharynx: Oropharynx is clear.   Eyes:      Extraocular Movements: Extraocular movements intact.      Conjunctiva/sclera: Conjunctivae normal.   Cardiovascular:      Rate and Rhythm: Normal rate and regular rhythm.      Heart sounds: Normal heart sounds.   Pulmonary:      Effort: Pulmonary effort is normal.      Breath sounds: Normal breath sounds.   Abdominal:      General: There is no distension.      Palpations: Abdomen is soft.      Tenderness: There is no abdominal tenderness. There is no right CVA tenderness or left CVA tenderness.   Musculoskeletal:         General: Normal range of motion.      Cervical back: Normal range of motion and neck supple.   Skin:     General: Skin is warm and dry.   Neurological:      General: No focal deficit present.      Mental Status: She is alert and oriented to person, place, and time.        Signed Electronically by: Deborah Kramer DO, MPH  "

## 2024-04-08 NOTE — PATIENT INSTRUCTIONS
Patient Education   Here is the plan from today's visit    1. Gross hematuria  - Basic metabolic panel; Future  - Adult Urology  Referral; Future  - US Renal Complete w Arterial Duplex; Future    2. Screening for hyperlipidemia  - Lipid panel reflex to direct LDL Non-fasting; Future    Please call or return to clinic if your symptoms don't go away.    Follow up plan  Return in about 4 weeks (around 5/6/2024).    Thank you for coming to [facility] today.  Lab Testing:  **If you had lab testing today and your results are reassuring or normal they will be mailed to you or sent through [employer] within 7 days.   **If the lab tests need quick action we will call you with the results.  **If you are having labs done on a different day, please call [phone] to schedule at [facility] or [phone] for other [facility] locations. Labs do not offer walk-in appointments.  The phone number we will call with results is # [phone] (home) . If this is not the best number please call our clinic and change the number.  Medication Refills:  If you need any refills please call your pharmacy and they will contact us.   If you need to  your refill at a new pharmacy, please contact the new pharmacy directly. The new pharmacy will help you get your medications transferred faster.   Scheduling:  If you have any concerns about today's visit or wish to schedule another appointment please call our office during normal business hours [phone] (8-5:00 M-F). If you can no longer make a scheduled visit, please cancel via [employer] or call us to cancel.   If a referral was made to an [facility] specialty provider and you do not get a call from central scheduling, please refer to directions on your visit summary or call our office during normal business hours for assistance.   If a Mammogram was ordered for you at the Breast Center call [phone] to schedule or change your  appointment.  If you had an XRay/CT/Ultrasound/MRI ordered the number is [phone] to schedule or change your radiology appointment.   [facility] has limited ultrasound appointments available on Wednesdays, if you would like your ultrasound at [facility], please call [phone] to schedule.   Medical Concerns:  If you have urgent medical concerns please call [phone] at any time of the day.    Deborah Kramer, DO

## 2024-04-08 NOTE — LETTER
April 15, 2024      Uday Stauffer  [address]  [address]  [place] [address]        Dear ,    We are writing to inform you of your test results.    Your labs are normal. Your cholesterol is slightly elevated, which is expected if you eat the same day as having your blood drawn. You do not need to start a medication at this time.     Resulted Orders   Lipid panel reflex to direct LDL Non-fasting   Result Value Ref Range    Cholesterol 188 <200 mg/dL    Triglycerides 75 <150 mg/dL    Direct Measure HDL 53 >=50 mg/dL    LDL Cholesterol Calculated 120 (H) <=100 mg/dL    Non HDL Cholesterol 135 (H) <130 mg/dL    Patient Fasting > 8hrs? No     Narrative    Cholesterol  Desirable:  <200 mg/dL    Triglycerides  Normal:  Less than 150 mg/dL  Borderline High:  150-199 mg/dL  High:  200-499 mg/dL  Very High:  Greater than or equal to 500 mg/dL    Direct Measure HDL  Female:  Greater than or equal to 50 mg/dL   Male:  Greater than or equal to 40 mg/dL    LDL Cholesterol  Desirable:  <100mg/dL  Above Desirable:  100-129 mg/dL   Borderline High:  130-159 mg/dL   High:  160-189 mg/dL   Very High:  >= 190 mg/dL    Non HDL Cholesterol  Desirable:  130 mg/dL  Above Desirable:  130-159 mg/dL  Borderline High:  160-189 mg/dL  High:  190-219 mg/dL  Very High:  Greater than or equal to 220 mg/dL   Basic metabolic panel   Result Value Ref Range    Sodium 140 135 - 145 mmol/L      Comment:      Reference intervals for this test were updated on 09/26/2023 to more accurately reflect our healthy population. There may be differences in the flagging of prior results with similar values performed with this method. Interpretation of those prior results can be made in the context of the updated reference intervals.     Potassium 4.1 3.4 - 5.3 mmol/L    Chloride 104 98 - 107 mmol/L    Carbon Dioxide (CO2) 24 22 - 29 mmol/L    Anion Gap 12 7 - 15 mmol/L    Urea Nitrogen 11.6 6.0 - 20.0 mg/dL    Creatinine 0.44 (L) 0.51 - 0.95 mg/dL     GFR Estimate >90 >60 mL/min/1.73m2    Calcium 9.9 8.6 - 10.0 mg/dL    Glucose 100 (H) 70 - 99 mg/dL       If you have any questions or concerns, please call the clinic at the number listed above.       Sincerely,      Deborah Kramer DO, MPH

## 2024-05-13 ENCOUNTER — OFFICE VISIT (OUTPATIENT)
Dept: UROLOGY | Facility: CLINIC | Age: 39
End: 2024-05-13
Attending: FAMILY MEDICINE
Payer: COMMERCIAL

## 2024-05-13 VITALS — OXYGEN SATURATION: 100 % | SYSTOLIC BLOOD PRESSURE: 118 MMHG | DIASTOLIC BLOOD PRESSURE: 83 MMHG | HEART RATE: 71 BPM

## 2024-05-13 DIAGNOSIS — R31.0 GROSS HEMATURIA: ICD-10-CM

## 2024-05-13 PROCEDURE — 88112 CYTOPATH CELL ENHANCE TECH: CPT | Performed by: PATHOLOGY

## 2024-05-13 PROCEDURE — 99204 OFFICE O/P NEW MOD 45 MIN: CPT | Performed by: PHYSICIAN ASSISTANT

## 2024-05-13 NOTE — PROGRESS NOTES
CC: Hematuria.    HPI: It is a pleasure to see Ms. Uday Stauffer, a 39 year old female, asked to be seen in consultation by Dr. Winston for evaluation of gross hematuria intermittently for ~1 year (Last year had flank pain associated with an episode).     The patient has had  gross hematuria.  Ms. Stauffer voids without difficulty.  She currently denies any dysuria, pyuria, hesitancy, intermittency, feelings of incomplete emptying, or any recent history of urinary tract infections or stones.    Hematuria Risk Factors:  Age >40: Yes     Smoking history: non-smoker and no second hand smoke exposure  Occupational exposure to chemicals or dyes (ie, benzenes, aromatic amines): no  History of urologic disorder or disease: no  History of irritative voiding symptoms: no  History of urinary tract infection: no  Analgesic abuse: no  History of pelvic irradiation: no    Past Medical History:   Diagnosis Date    Gonococcal infection 03/2013     No past surgical history on file.  No current outpatient medications on file.     Allergies   Allergen Reactions    No Known Drug Allergy      FAMILY HISTORY: There is no reported history of genitourinary carcinoma.  There is no history of urolithiasis.      Social History     Socioeconomic History    Marital status: Single     Spouse name: Not on file    Number of children: Not on file    Years of education: Not on file    Highest education level: Not on file   Occupational History    Not on file   Tobacco Use    Smoking status: Former    Smokeless tobacco: Never    Tobacco comments:     quit in 2019   Substance and Sexual Activity    Alcohol use: Yes     Comment: once a month    Drug use: No    Sexual activity: Yes     Partners: Male     Birth control/protection: Injection   Other Topics Concern    Parent/sibling w/ CABG, MI or angioplasty before 65F 55M? Not Asked   Social History Narrative    Not on file     Social Determinants of Health     Financial Resource Strain: High Risk  (1/25/2024)    Financial Resource Strain     Within the past 12 months, have you or your family members you live with been unable to get utilities (heat, electricity) when it was really needed?: Yes   Food Insecurity: High Risk (1/25/2024)    Food Insecurity     Within the past 12 months, did you worry that your food would run out before you got money to buy more?: Yes     Within the past 12 months, did the food you bought just not last and you didn t have money to get more?: Yes   Transportation Needs: High Risk (1/25/2024)    Transportation Needs     Within the past 12 months, has lack of transportation kept you from medical appointments, getting your medicines, non-medical meetings or appointments, work, or from getting things that you need?: Yes   Physical Activity: Not on File (3/13/2020)    Received from [facility]     Physical Activity     Physical Activity: 0   Stress: Not on File (3/13/2020)    Received from [facility]     Stress     Stress: 0   Social Connections: Not on File (3/13/2020)    Received from [employer]     Social Connections     Social Connections and Isolation: 0   Interpersonal Safety: Low Risk  (4/8/2024)    Interpersonal Safety     Do you feel physically and emotionally safe where you currently live?: Yes     Within the past 12 months, have you been hit, slapped, kicked or otherwise physically hurt by someone?: No     Within the past 12 months, have you been humiliated or emotionally abused in other ways by your partner or ex-partner?: No   Housing Stability: High Risk (1/25/2024)    Housing Stability     Do you have housing? : No     Are you worried about losing your housing?: Yes       PHYSICAL EXAM:   There were no vitals filed for this visit.  PSYCH: NAD  EYES: EOMI  MOUTH: MMM  NEURO: AAO x3    Office Visit on 04/08/2024   Component Date Value Ref Range Status    Cholesterol 04/08/2024 188  <200 mg/dL Final    Triglycerides 04/08/2024 75  <150 mg/dL Final    Direct Measure HDL 04/08/2024 53  >=50  mg/dL Final    LDL Cholesterol Calculated 2024 120 (H)  <=100 mg/dL Final    Non HDL Cholesterol 2024 135 (H)  <130 mg/dL Final    Patient Fasting > 8hrs? 2024 No   Final    Sodium 2024 140  135 - 145 mmol/L Final    Reference intervals for this test were updated on 2023 to more accurately reflect our healthy population. There may be differences in the flagging of prior results with similar values performed with this method. Interpretation of those prior results can be made in the context of the updated reference intervals.     Potassium 2024 4.1  3.4 - 5.3 mmol/L Final    Chloride 2024 104  98 - 107 mmol/L Final    Carbon Dioxide (CO2) 2024 24  22 - 29 mmol/L Final    Anion Gap 2024 12  7 - 15 mmol/L Final    Urea Nitrogen 2024 11.6  6.0 - 20.0 mg/dL Final    Creatinine 2024 0.44 (L)  0.51 - 0.95 mg/dL Final    GFR Estimate 2024 >90  >60 mL/min/1.73m2 Final    Calcium 2024 9.9  8.6 - 10.0 mg/dL Final    Glucose 2024 100 (H)  70 - 99 mg/dL Final       IMAGIN23 CT IV contrast (Suggests renal stone, images not avail)    ASSESSMENT and PLAN:    39 year old female with gross hematuria.  The differential diagnosis at this point includes stone disease, infection, vaginal contaminant, urothelial malignancy, renal disorder versus another yet unknown diagnosis.    At this time, recommend proceeding with comprehensive hematuria evaluation to include:  - Urine cytology to look for cells concerning for malignancy.  - CT urogram for upper tract imaging.  - Cystoscopy with Dr. Mcclellan (unless large stone burden on CT) to evaluate the interior of the bladder. Follow up for hematuria as recommended by urologist performing cystoscopic evaluation.    Thank you for allowing me to participate in Ms. Stauffer's care. I will keep you updated of her progress, but please do not hesitate to contact me with any questions.    RAMAKRISHNA Owens [facility]  Urology  25 minutes spent on the date of the encounter doing chart review, review of outside records, review of test results, interpretation of tests, patient visit and documentation.

## 2024-05-13 NOTE — LETTER
May 23, 2024      Uday Stauffer  [address]  [address]  [place] [address]        Dear Uday,    Thank you for getting your care at [facility]. Please see below for your test results.  Please follow up with your urology team to discuss these results in detail.     Resulted Orders   Cytology, non-gynecologic   Result Value Ref Range    Final Diagnosis       Specimen A     Interpretation:      Negative for High Grade Urothelial Carcinoma.     Other Findings:      Acute inflammation present.     Adequacy:     Satisfactory for evaluation.      Clinical Information       Gross hematuria intermittently for one year .      Gross Description       A(A). Urine, Clean Catch, Urine Cytology:  Received 50 ml of hazy yellow fluid, processed as one Pap stained [employer].      Microscopic Description       Microscopic examination was performed.        Performing Labs       The technical component of this testing was completed at [facility].     Stain controls for all stains resulted within this report have been reviewed and show appropriate reactivity.          If you have any concerns about these results please call and leave a message for me..    Sincerely,    Carolann Winston MD

## 2024-05-13 NOTE — PATIENT INSTRUCTIONS
- Urine cytology to look for abnormal cells.  - CT scan of the kidneys.   - My office will call you to arrange a cystoscopy with the  urologist to evaluate the interior of the bladder. Follow up as recommended by the urologist.    CYSTOSCOPY    What is a Cystoscopy?  This is a procedure done to check for problems inside the bladder.  Problems may include polyps (growths), tumors, inflammation (swelling and redness) and other concerns.    The Urologist inserts a thin tube (called a cystoscope) into the bladder.  The tube is about the size of a pencil.  We will give you numbing medicine to reduce the pain or discomfort you may feel.    The Urologist will be able to see inside the bladder by filling the bladder with water.  The water makes it easier to see any problems that may be present. You will have a sense to need to urinate and this is normal.       How should I get ready for the exam?  Nothing to do to prepare. You may eat normally the day of the exam. There is no sedation, so you may drive yourself to and from if you can drive.       Please tell your doctor if:  You have a history of urinary tract infections.  You know that you have a tumor in your bladder.  You have bleeding problems.  You have any allergies.  You are or may be pregnant.      What happens after the exam?  You may go back to your normal diet and activity as you feel ready.    For the next two days after the exam, you may notice:  Some blood in your urine.  Some burning when you urinate (use the toilet).  An urge to urinate more often.  Bladder spasms.    These are normal after the procedure. They should go away on their own after a day or two.      You can help to relieve the above listed symptoms by:  Drinking 6 to 8 large glasses of water each day (includes drinks at meals).  This will help clear the urine.  Take warm baths to relieve pain and bladder spasms.  Do not add anything to the bath water.  You may take Tylenol (acetaminophen) per  label instructions for discomfort.

## 2024-05-13 NOTE — NURSING NOTE
Pt last had gross hem 3-4 days ago.  Pt denies pain.  Pt has dysuria after she voids.    Pt states she is having some freq.  UA left ANASTASIIA Villalta CMA

## 2024-05-13 NOTE — LETTER
5/13/2024       RE: Uday Stauffer  [address]  [place] [address]     Dear Colleague,    Thank you for referring your patient, Uday Stauffer, to the [facility] [place] at [facility]. Please see a copy of my visit note below.    CC: Hematuria.    HPI: It is a pleasure to see Ms. Uday Stauffer, a 39 year old female, asked to be seen in consultation by Dr. Winston for evaluation of gross hematuria intermittently for ~1 year (Last year had flank pain associated with an episode).     The patient has had  gross hematuria.  Ms. Stauffer voids without difficulty.  She currently denies any dysuria, pyuria, hesitancy, intermittency, feelings of incomplete emptying, or any recent history of urinary tract infections or stones.    Hematuria Risk Factors:  Age >40: Yes     Smoking history: non-smoker and no second hand smoke exposure  Occupational exposure to chemicals or dyes (ie, benzenes, aromatic amines): no  History of urologic disorder or disease: no  History of irritative voiding symptoms: no  History of urinary tract infection: no  Analgesic abuse: no  History of pelvic irradiation: no    Past Medical History:   Diagnosis Date    Gonococcal infection 03/2013     No past surgical history on file.  No current outpatient medications on file.     Allergies   Allergen Reactions    No Known Drug Allergy      FAMILY HISTORY: There is no reported history of genitourinary carcinoma.  There is no history of urolithiasis.      Social History     Socioeconomic History    Marital status: Single     Spouse name: Not on file    Number of children: Not on file    Years of education: Not on file    Highest education level: Not on file   Occupational History    Not on file   Tobacco Use    Smoking status: Former    Smokeless tobacco: Never    Tobacco comments:     quit in 2019   Substance and Sexual Activity    Alcohol use: Yes     Comment: once a month     Drug use: No    Sexual activity: Yes     Partners: Male     Birth control/protection: Injection   Other Topics Concern    Parent/sibling w/ CABG, MI or angioplasty before 65F 55M? Not Asked   Social History Narrative    Not on file     Social Determinants of Health     Financial Resource Strain: High Risk (1/25/2024)    Financial Resource Strain     Within the past 12 months, have you or your family members you live with been unable to get utilities (heat, electricity) when it was really needed?: Yes   Food Insecurity: High Risk (1/25/2024)    Food Insecurity     Within the past 12 months, did you worry that your food would run out before you got money to buy more?: Yes     Within the past 12 months, did the food you bought just not last and you didn t have money to get more?: Yes   Transportation Needs: High Risk (1/25/2024)    Transportation Needs     Within the past 12 months, has lack of transportation kept you from medical appointments, getting your medicines, non-medical meetings or appointments, work, or from getting things that you need?: Yes   Physical Activity: Not on File (3/13/2020)    Received from [employer]     Physical Activity     Physical Activity: 0   Stress: Not on File (3/13/2020)    Received from [employer]     Stress     Stress: 0   Social Connections: Not on File (3/13/2020)    Received from [employer]     Social Connections     Social Connections and Isolation: 0   Interpersonal Safety: Low Risk  (4/8/2024)    Interpersonal Safety     Do you feel physically and emotionally safe where you currently live?: Yes     Within the past 12 months, have you been hit, slapped, kicked or otherwise physically hurt by someone?: No     Within the past 12 months, have you been humiliated or emotionally abused in other ways by your partner or ex-partner?: No   Housing Stability: High Risk (1/25/2024)    Housing Stability     Do you have housing? : No     Are you worried about losing your housing?: Yes       PHYSICAL  EXAM:   There were no vitals filed for this visit.  PSYCH: NAD  EYES: EOMI  MOUTH: MMM  NEURO: AAO x3    Office Visit on 2024   Component Date Value Ref Range Status    Cholesterol 2024 188  <200 mg/dL Final    Triglycerides 2024 75  <150 mg/dL Final    Direct Measure HDL 2024 53  >=50 mg/dL Final    LDL Cholesterol Calculated 2024 120 (H)  <=100 mg/dL Final    Non HDL Cholesterol 2024 135 (H)  <130 mg/dL Final    Patient Fasting > 8hrs? 2024 No   Final    Sodium 2024 140  135 - 145 mmol/L Final    Reference intervals for this test were updated on 2023 to more accurately reflect our healthy population. There may be differences in the flagging of prior results with similar values performed with this method. Interpretation of those prior results can be made in the context of the updated reference intervals.     Potassium 2024 4.1  3.4 - 5.3 mmol/L Final    Chloride 2024 104  98 - 107 mmol/L Final    Carbon Dioxide (CO2) 2024 24  22 - 29 mmol/L Final    Anion Gap 2024 12  7 - 15 mmol/L Final    Urea Nitrogen 2024 11.6  6.0 - 20.0 mg/dL Final    Creatinine 2024 0.44 (L)  0.51 - 0.95 mg/dL Final    GFR Estimate 2024 >90  >60 mL/min/1.73m2 Final    Calcium 2024 9.9  8.6 - 10.0 mg/dL Final    Glucose 2024 100 (H)  70 - 99 mg/dL Final       IMAGIN23 CT IV contrast (Suggests renal stone, images not avail)    ASSESSMENT and PLAN:    39 year old female with gross hematuria.  The differential diagnosis at this point includes stone disease, infection, vaginal contaminant, urothelial malignancy, renal disorder versus another yet unknown diagnosis.    At this time, recommend proceeding with comprehensive hematuria evaluation to include:  - Urine cytology to look for cells concerning for malignancy.  - CT urogram for upper tract imaging.  - Cystoscopy with Dr. Mcclellan (unless large stone burden on CT) to evaluate the  interior of the bladder. Follow up for hematuria as recommended by urologist performing cystoscopic evaluation.    Thank you for allowing me to participate in Ms. Stauffer's care. I will keep you updated of her progress, but please do not hesitate to contact me with any questions.    Alicia Wright PA-C  [facility]  25 minutes spent on the date of the encounter doing chart review, review of outside records, review of test results, interpretation of tests, patient visit and documentation.

## 2024-05-13 NOTE — LETTER
May 20, 2024      Uday Stauffer  [address]  [place] [address]        Dear Uday,    Thank you for getting your care at [facility]. Please see below for your test results.    Resulted Orders   Cytology, non-gynecologic   Result Value Ref Range    Final Diagnosis       Specimen A     Interpretation:      Negative for High Grade Urothelial Carcinoma.     Other Findings:      Acute inflammation present.     Adequacy:     Satisfactory for evaluation.      Clinical Information       Gross hematuria intermittently for one year .      Gross Description       A(A). Urine, Clean Catch, Urine Cytology:  Received 50 ml of hazy yellow fluid, processed as one Pap stained [employer].      Microscopic Description       Microscopic examination was performed.        Performing Labs       The technical component of this testing was completed at [facility].     Stain controls for all stains resulted within this report have been reviewed and show appropriate reactivity.              Sincerely,    Carolann Winston MD

## 2024-05-14 LAB
PATH REPORT.COMMENTS IMP SPEC: NORMAL
PATH REPORT.FINAL DX SPEC: NORMAL
PATH REPORT.GROSS SPEC: NORMAL
PATH REPORT.MICROSCOPIC SPEC OTHER STN: NORMAL
PATH REPORT.RELEVANT HX SPEC: NORMAL

## 2024-05-29 ENCOUNTER — OFFICE VISIT (OUTPATIENT)
Dept: UROLOGY | Facility: CLINIC | Age: 39
End: 2024-05-29
Payer: COMMERCIAL

## 2024-05-29 VITALS
OXYGEN SATURATION: 98 % | WEIGHT: 136 LBS | BODY MASS INDEX: 26.7 KG/M2 | HEIGHT: 60 IN | HEART RATE: 91 BPM | DIASTOLIC BLOOD PRESSURE: 84 MMHG | SYSTOLIC BLOOD PRESSURE: 120 MMHG

## 2024-05-29 DIAGNOSIS — R31.0 GROSS HEMATURIA: Primary | ICD-10-CM

## 2024-05-29 DIAGNOSIS — F17.200 TOBACCO USE DISORDER: ICD-10-CM

## 2024-05-29 LAB
ALBUMIN UR-MCNC: NEGATIVE MG/DL
APPEARANCE UR: CLEAR
BILIRUB UR QL STRIP: NEGATIVE
COLOR UR AUTO: YELLOW
GLUCOSE UR STRIP-MCNC: NEGATIVE MG/DL
HGB UR QL STRIP: NEGATIVE
KETONES UR STRIP-MCNC: NEGATIVE MG/DL
LEUKOCYTE ESTERASE UR QL STRIP: ABNORMAL
NITRATE UR QL: NEGATIVE
PH UR STRIP: 6 [PH] (ref 5–7)
SP GR UR STRIP: <=1.005 (ref 1–1.03)
UROBILINOGEN UR STRIP-ACNC: 0.2 E.U./DL

## 2024-05-29 PROCEDURE — 81003 URINALYSIS AUTO W/O SCOPE: CPT | Mod: QW | Performed by: UROLOGY

## 2024-05-29 PROCEDURE — 99213 OFFICE O/P EST LOW 20 MIN: CPT | Performed by: UROLOGY

## 2024-05-29 RX ORDER — LIDOCAINE HYDROCHLORIDE 20 MG/ML
JELLY TOPICAL ONCE
Status: COMPLETED | OUTPATIENT
Start: 2024-05-29 | End: 2024-05-29

## 2024-05-29 RX ADMIN — LIDOCAINE HYDROCHLORIDE 5 ML: 20 JELLY TOPICAL at 09:36

## 2024-05-29 ASSESSMENT — PAIN SCALES - GENERAL: PAINLEVEL: NO PAIN (0)

## 2024-05-29 NOTE — PROGRESS NOTES
May 29, 2024    Return visit    Patient returns today for follow up of gross hematuria.  She denies any changes in her health since last visit.    LMP 03/23/2024 (Approximate)   She is comfortable, in no distress, non-labored breathing.  Abdomen is soft, non-tender, non-distended.  Normal external female genitalia.  Negative CST.      Cystoscopy Note: After informed consent was obtained patient was prepped and draped in the standard fashion.  The flexible cystoscope was inserted into a normal appearing urethral meatus but unable to get past the midurethra as patient because to experience pain and unable to relax her pelvic floor    A/P: 39 year old F smoker with gross hematuria    CT prior no obvious etiology, prior cytology negative so need to do the cystoscopy.  She requests to have this done under sedation so will plan on cystoscopy with possible bladder biopsy    We discussed that the risks to the procedure include but not limited to cardiovascular risks of anesthesia, nerve injury, bleeding, infection, injury to adjacent organs, need for postoperative catheter, need for further procedures pending the findings.  She is agreeable to proceed in future    15 minutes were spent today on the date of the encounter in reviewing the EMR, direct patient care including surgical counseling, coordination of care and documentation    Hortensia Mcclellan MD MPH  (she/her/hers)   of Urology  [facility]      CC  Patient Care Team:  Betsey Kovacs MD as PCP - General (Family Medicine)  Betsey Kovacs MD as Assigned PCP  Alicia Wright PA-C as Physician Assistant (Urology)  Alicia Wright PA-C as Assigned OBGYN Provider

## 2024-05-29 NOTE — LETTER
5/29/2024       RE: Uday Stauffer  [address]  [place] [address]     Dear Colleague,    Thank you for referring your patient, Uday Stauffer, to the [facility] UROLOGY CLIN[facility] [place] at [facility]. Please see a copy of my visit note below.    May 29, 2024    Return visit    Patient returns today for follow up of gross hematuria.  She denies any changes in her health since last visit.    LMP 03/23/2024 (Approximate)   She is comfortable, in no distress, non-labored breathing.  Abdomen is soft, non-tender, non-distended.  Normal external female genitalia.  Negative CST.      Cystoscopy Note: After informed consent was obtained patient was prepped and draped in the standard fashion.  The flexible cystoscope was inserted into a normal appearing urethral meatus but unable to get past the midurethra as patient because to experience pain and unable to relax her pelvic floor    A/P: 39 year old F smoker with gross hematuria    CT prior no obvious etiology, prior cytology negative so need to do the cystoscopy.  She requests to have this done under sedation so will plan on cystoscopy with possible bladder biopsy    We discussed that the risks to the procedure include but not limited to cardiovascular risks of anesthesia, nerve injury, bleeding, infection, injury to adjacent organs, need for postoperative catheter, need for further procedures pending the findings.  She is agreeable to proceed in future    15 minutes were spent today on the date of the encounter in reviewing the EMR, direct patient care including surgical counseling, coordination of care and documentation    Hortensia Mcclellan MD MPH  (she/her/hers)   of Urology  [facility]      CC  Patient Care Team:  Betsey Kovacs MD as PCP - General (Family Medicine)  Betsey Kovacs MD as Assigned PCP  Alicia Wright PAREBECA as Physician Assistant (Urology)  Alicia Wright,  RAMAKRISHNA as Assigned OBGYN Provider

## 2024-05-29 NOTE — NURSING NOTE
Chief Complaint   Patient presents with    Hematuria     In office cystoscopy    Prior to the start of the procedure and with procedural staff participation, I verbally confirmed the patient s identity using two indicators, relevant allergies, that the procedure was appropriate and matched the consent or emergent situation, and that the correct equipment/implants were available. Immediately prior to starting the procedure I conducted the Time Out with the procedural staff and re-confirmed the patient s name, procedure, and site/side. I have wiped the patient off with the povidone-Iodine solution, draped them,  used Lidocaine hydrochloride jelly, and instilled sterile water into the bladder. (The Joint Commission universal protocol was followed.)  Yes    Sedation (Moderate or Deep): None   5mL 2% lidocaine hydrochloride Urojet instilled into urethra.    NDC# [address]-5  Lot #: [id number]  Expiration Date:  9-25    Patient did not go through with the scope. She said it was too painful.  Lesvia Zaragoza LPN

## 2024-05-29 NOTE — PATIENT INSTRUCTIONS
"Websites with free information:    [employer] patient website: www.voicesforpfd.org    Total Control Program: www.totalcontrolprogram.Promethean    Gay CONN Care Coordinator [phone]    Surgery scheduler [phone]    It was a pleasure meeting with you today.  Thank you for allowing me and my team the privilege of caring for you today.  YOU are the reason we are here, and I truly hope we provided you with the excellent service you deserve.  Please let us know if there is anything else we can do for you so that we can be sure you are leaving completely satisfied with your care experience.    AFTER YOUR CYSTOSCOPY  ?  ?  You have just completed a cystoscopy, or \"cysto\", which allowed your physician to learn more about your bladder (or to remove a stent placed after surgery). We suggest that you continue to avoid caffeine, fruit juice, and alcohol for the next 24 hours, however, you are encouraged to return to your normal activities.  ?  ?  A few things that are considered normal after your cystoscopy:  ?  * small amount of bleeding (or spotting) that clears within the next 24 hours  ?  * slight burning sensation with urination  ?  * sensation of needing to void (urinate) more frequently  ?  * the feeling of \"air\" in your urine  ?  * mild discomfort that is relieved with Tylenol    * bladder spasms  ?  ?  ?  Please contact our office promptly if you:  ?  * develop a fever above 101 degrees  ?  * are unable to urinate  ?  * develop bright red blood that does not stop  ?  * experience severe pain or swelling  ?  ?  ?  And of course, please contact our office with any concerns or questions [phone].  ?   "

## 2024-05-30 ENCOUNTER — TELEPHONE (OUTPATIENT)
Dept: UROLOGY | Facility: CLINIC | Age: 39
End: 2024-05-30
Payer: COMMERCIAL

## 2024-05-30 NOTE — TELEPHONE ENCOUNTER
Called patient to schedule surgery/procedure with Dr. Mcclellan there was no answer, left voice message for patient to callback direct line to schedule.     Gary Valdez on 5/30/2024 at 2:21 PM

## 2024-06-17 PROBLEM — Z76.89 HEALTH CARE HOME: Status: RESOLVED | Noted: 2024-06-17 | Resolved: 2024-06-17

## 2024-06-27 PROBLEM — R31.0 GROSS HEMATURIA: Status: ACTIVE | Noted: 2024-05-29

## 2024-06-27 NOTE — TELEPHONE ENCOUNTER
Called patient to schedule surgery with Dr. Mcclellan    Spoke with: Uday    Date of Surgery: 8/5    Approximate arrival time given:  Yes    Location of surgery: [facility]     Pre-Op H&P: PCP - GISELA Carlson    Post-Op Appt Date: Needs 1-2 week po FOR RESULTS, MAY BE VIRTUAL. Nothing available in schedule.      Imaging needed:  No    Discussed with patient pre-op RN will call 2-3 days prior to surgery with arrival time and instructions:  Yes    Discussed with patient PAC RN will provide arrival time and instructions for surgery at the time of the appointment: [[place] locations only]: Not Applicable      Packet sent out: Yes 06/27/24  via Mail - Standard      Additional Comments:  NA      All patients questions were answered and was instructed to review surgical packet and call back with any questions or concerns.       Jaimee Genao on 6/27/2024 at 10:58 AM

## 2024-07-26 DIAGNOSIS — R31.0 GROSS HEMATURIA: Primary | ICD-10-CM

## 2024-08-01 ENCOUNTER — OFFICE VISIT (OUTPATIENT)
Dept: FAMILY MEDICINE | Facility: CLINIC | Age: 39
End: 2024-08-01
Payer: COMMERCIAL

## 2024-08-01 VITALS
BODY MASS INDEX: 26.25 KG/M2 | RESPIRATION RATE: 17 BRPM | HEIGHT: 60 IN | HEART RATE: 80 BPM | SYSTOLIC BLOOD PRESSURE: 119 MMHG | WEIGHT: 133.7 LBS | OXYGEN SATURATION: 100 % | TEMPERATURE: 98.4 F | DIASTOLIC BLOOD PRESSURE: 83 MMHG

## 2024-08-01 DIAGNOSIS — R31.0 GROSS HEMATURIA: ICD-10-CM

## 2024-08-01 DIAGNOSIS — Z01.818 PRE-OP EXAM: Primary | ICD-10-CM

## 2024-08-01 LAB
ALBUMIN UR-MCNC: NEGATIVE MG/DL
APPEARANCE UR: CLEAR
BACTERIA #/AREA URNS HPF: ABNORMAL /HPF
BASOPHILS # BLD AUTO: 0 10E3/UL (ref 0–0.2)
BASOPHILS NFR BLD AUTO: 0 %
BILIRUB UR QL STRIP: NEGATIVE
COLOR UR AUTO: ABNORMAL
EOSINOPHIL # BLD AUTO: 0.1 10E3/UL (ref 0–0.7)
EOSINOPHIL NFR BLD AUTO: 1 %
ERYTHROCYTE [DISTWIDTH] IN BLOOD BY AUTOMATED COUNT: 13.5 % (ref 10–15)
GLUCOSE UR STRIP-MCNC: NEGATIVE MG/DL
HCT VFR BLD AUTO: 43.9 % (ref 35–47)
HGB BLD-MCNC: 13.9 G/DL (ref 11.7–15.7)
HGB UR QL STRIP: NEGATIVE
IMM GRANULOCYTES # BLD: 0.1 10E3/UL
IMM GRANULOCYTES NFR BLD: 1 %
KETONES UR STRIP-MCNC: NEGATIVE MG/DL
LEUKOCYTE ESTERASE UR QL STRIP: ABNORMAL
LYMPHOCYTES # BLD AUTO: 2.2 10E3/UL (ref 0.8–5.3)
LYMPHOCYTES NFR BLD AUTO: 21 %
MCH RBC QN AUTO: 28.3 PG (ref 26.5–33)
MCHC RBC AUTO-ENTMCNC: 31.7 G/DL (ref 31.5–36.5)
MCV RBC AUTO: 89 FL (ref 78–100)
MONOCYTES # BLD AUTO: 0.8 10E3/UL (ref 0–1.3)
MONOCYTES NFR BLD AUTO: 7 %
MUCOUS THREADS #/AREA URNS LPF: PRESENT /LPF
NEUTROPHILS # BLD AUTO: 7.4 10E3/UL (ref 1.6–8.3)
NEUTROPHILS NFR BLD AUTO: 70 %
NITRATE UR QL: NEGATIVE
NRBC # BLD AUTO: 0 10E3/UL
NRBC BLD AUTO-RTO: 0 /100
PH UR STRIP: 7.5 [PH] (ref 5–7)
PLATELET # BLD AUTO: 272 10E3/UL (ref 150–450)
RBC # BLD AUTO: 4.91 10E6/UL (ref 3.8–5.2)
RBC URINE: 1 /HPF
SP GR UR STRIP: 1.01 (ref 1–1.03)
SQUAMOUS EPITHELIAL: 1 /HPF
TRANSITIONAL EPI: <1 /HPF
UROBILINOGEN UR STRIP-MCNC: NORMAL MG/DL
WBC # BLD AUTO: 10.6 10E3/UL (ref 4–11)
WBC CLUMPS #/AREA URNS HPF: PRESENT /HPF
WBC URINE: 14 /HPF

## 2024-08-01 PROCEDURE — 87186 SC STD MICRODIL/AGAR DIL: CPT | Mod: ORL | Performed by: UROLOGY

## 2024-08-01 PROCEDURE — 87086 URINE CULTURE/COLONY COUNT: CPT | Mod: ORL | Performed by: UROLOGY

## 2024-08-01 PROCEDURE — 81001 URINALYSIS AUTO W/SCOPE: CPT | Mod: ORL | Performed by: UROLOGY

## 2024-08-01 PROCEDURE — 85025 COMPLETE CBC W/AUTO DIFF WBC: CPT | Mod: ORL | Performed by: NURSE PRACTITIONER

## 2024-08-01 PROCEDURE — 84155 ASSAY OF PROTEIN SERUM: CPT | Mod: ORL | Performed by: NURSE PRACTITIONER

## 2024-08-01 PROCEDURE — 84450 TRANSFERASE (AST) (SGOT): CPT | Mod: ORL | Performed by: NURSE PRACTITIONER

## 2024-08-01 ASSESSMENT — PAIN SCALES - GENERAL: PAINLEVEL: NO PAIN (0)

## 2024-08-01 NOTE — PROGRESS NOTES
Preoperative Evaluation  [facility] [address] [address]  Phone: [phone]  Fax: [phone]  Primary Provider: Betsey Kovacs MD  Pre-op Performing Provider: Kelly Chapman NP  Aug 1, 2024           8/1/2024   Surgical Information   What procedure is being done? cysoscopy with possible bladder biopsy   Facility or Hospital where procedure/surgery will be performed: [facility]   Who is doing the procedure / surgery? dc sheeba   Date of surgery / procedure: [id number]   Time of surgery / procedure: 8   Where do you plan to recover after surgery? Other -       Fax number for surgical facility: Note does not need to be faxed, will be available electronically in Epic.    Assessment & Plan     The proposed surgical procedure is considered LOW risk.    Pre-op exam    - CBC with platelets differential; Future  - Comprehensive metabolic panel; Future    Gross hematuria    - Routine UA with microscopic - No culture  - Urine Culture Aerobic Bacterial     - No identified additional risk factors other than previously addressed    Recommendation  Approval given to proceed with proposed procedure, without further diagnostic evaluation.    Sandra Frye is a 39 year old, presenting for the following:  Pre-Op Exam    HPI related to upcoming procedure: Uday states that for about one year she has had some pelvic pain.  She had cramping, particularly on her right lower abdomen.  She had a CT abdomen/pelvis in August of 2023, no reason seen for the pain.  She was referred to a urologist recently who attempted a cystoscopy in the office, it was too painful for her in that environment, it was recommended that she have this done with sedation.         8/1/2024   Pre-Op Questionnaire   Have you ever had a heart attack or stroke? No   Have you ever had surgery on your heart or blood vessels, such as a stent placement, a coronary artery bypass, or surgery on an artery in your head, neck, heart,  or legs? No   Do you have chest pain with activity? No   Do you have a history of heart failure? No   Do you currently have a cold, bronchitis or symptoms of other infection? No   Do you have a cough, shortness of breath, or wheezing? No   Do you or anyone in your family have previous history of blood clots? No   Do you or does anyone in your family have a serious bleeding problem such as prolonged bleeding following surgeries or cuts? No   Have you ever had problems with anemia or been told to take iron pills? No   Have you had any abnormal blood loss such as black, tarry or bloody stools, or abnormal vaginal bleeding? No   Have you ever had a blood transfusion? No   Are you willing to have a blood transfusion if it is medically needed before, during, or after your surgery? Yes   Have you or any of your relatives ever had problems with anesthesia? No   Do you have sleep apnea, excessive snoring or daytime drowsiness? No   Do you have any artifical heart valves or other implanted medical devices like a pacemaker, defibrillator, or continuous glucose monitor? No   Do you have artificial joints? No   Are you allergic to latex? No      Health Care Directive  Patient does not have a Health Care Directive or Living Will: Discussed advance care planning with patient; information given to patient to review.    Preoperative Review of    reviewed - no record of controlled substances prescribed.    Patient Active Problem List    Diagnosis Date Noted    Gross hematuria 05/29/2024     Priority: Medium    Acute pain of left shoulder 02/28/2023     Priority: Medium    Migraine with aura and without status migrainosus, not intractable 07/16/2021     Priority: Medium    Hx of one miscarriage 07/16/2021     Priority: Medium    Alopecia 05/29/2014     Priority: Medium     Problem list name updated by automated process. Provider to review      Tobacco use disorder      Priority: Medium    Contact dermatitis and other eczema, due  to unspecified cause 07/19/2012     Priority: Medium      Past Medical History:   Diagnosis Date    Gonococcal infection 03/01/2013     Past Surgical History:   Procedure Laterality Date    CYSTOSCOPY       No current outpatient medications on file.       Allergies   Allergen Reactions    No Known Drug Allergy         Social History     Tobacco Use    Smoking status: Former    Smokeless tobacco: Never    Tobacco comments:     quit in 2019   Substance Use Topics    Alcohol use: Yes     Comment: once a month     Family History   Problem Relation Age of Onset    Diabetes Father      History   Drug Use No         Review of Systems  CONSTITUTIONAL: NEGATIVE for fever, chills, change in weight  INTEGUMENTARY/SKIN: NEGATIVE for worrisome rashes, moles or lesions  EYES: NEGATIVE for vision changes or irritation  ENT/MOUTH: NEGATIVE for ear, mouth and throat problems  RESP: NEGATIVE for significant cough or SOB  BREAST: NEGATIVE for masses, tenderness or discharge  CV: NEGATIVE for chest pain, palpitations or peripheral edema  GI: NEGATIVE for nausea, abdominal pain, heartburn, or change in bowel habits  : NEGATIVE for frequency, dysuria, or hematuria  MUSCULOSKELETAL: NEGATIVE for significant arthralgias or myalgia  NEURO: NEGATIVE for weakness, dizziness or paresthesias  ENDOCRINE: NEGATIVE for temperature intolerance, skin/hair changes  HEME: NEGATIVE for bleeding problems  PSYCHIATRIC: NEGATIVE for changes in mood or affect    Objective    There were no vitals taken for this visit.   Estimated body mass index is 26.56 kg/m  as calculated from the following:    Height as of 5/29/24: 1.524 m (5').    Weight as of 5/29/24: 61.7 kg (136 lb).  Physical Exam  GENERAL: alert and no distress  EYES: Eyes grossly normal to inspection, PERRL and conjunctivae and sclerae normal  HENT: ear canals and TM's normal, nose and mouth without ulcers or lesions  NECK: no adenopathy, no asymmetry, masses, or scars  RESP: lungs clear to  auscultation - no rales, rhonchi or wheezes  CV: regular rate and rhythm, normal S1 S2, no S3 or S4, no murmur, click or rub, no peripheral edema  ABDOMEN: soft, nontender, no hepatosplenomegaly, no masses and bowel sounds normal  MS: no gross musculoskeletal defects noted, no edema  SKIN: no suspicious lesions or rashes  NEURO: Normal strength and tone, mentation intact and speech normal  PSYCH: mentation appears normal, affect normal/bright    Recent Labs   Lab Test 04/08/24  0916      POTASSIUM 4.1   CR 0.44*        Diagnostics  Labs pending at this time.  Results will be reviewed when available.   No EKG required for low risk surgery (cataract, skin procedure, breast biopsy, etc).    Revised Cardiac Risk Index (RCRI)  The patient has the following serious cardiovascular risks for perioperative complications:   - No serious cardiac risks = 0 points     RCRI Interpretation: 0 points: Class I (very low risk - 0.4% complication rate)    Signed Electronically by: Kelly Chapman NP  A copy of this evaluation report is provided to the requesting physician.

## 2024-08-01 NOTE — NURSING NOTE
"ROOM:1  MARGARITA PEARSON    Preferred Name: Uday     How did you hear about us?  Other - Referred by another clinic     Services Provided? No    39 year old  Chief Complaint   Patient presents with    Pre-Op Exam     No concerns       Blood pressure 119/83, pulse 80, temperature 98.4  F (36.9  C), temperature source Oral, resp. rate 17, height 1.527 m (5' 0.1\"), weight 60.6 kg (133 lb 11.2 oz), last menstrual period 07/03/2024, SpO2 100%, unknown if currently breastfeeding. Body mass index is 26.02 kg/m .  BP completed using cuff size:        Patient Active Problem List   Diagnosis    Contact dermatitis and other eczema, due to unspecified cause    Tobacco use disorder    Alopecia    Migraine with aura and without status migrainosus, not intractable    Hx of one miscarriage    Acute pain of left shoulder    Gross hematuria       Wt Readings from Last 2 Encounters:   08/01/24 60.6 kg (133 lb 11.2 oz)   05/29/24 61.7 kg (136 lb)     BP Readings from Last 3 Encounters:   08/01/24 119/83   05/29/24 120/84   05/13/24 118/83       Allergies   Allergen Reactions    No Known Drug Allergy        No current outpatient medications on file.     No current facility-administered medications for this visit.       Social History     Tobacco Use    Smoking status: Former    Smokeless tobacco: Never    Tobacco comments:     quit in 2019   Substance Use Topics    Alcohol use: Yes     Comment: once a month    Drug use: No       Honoring Choices - Health Care Directive Guide offered to patient at time of visit.    Health Maintenance Due   Topic Date Due    YEARLY PREVENTIVE VISIT  Never done    ADVANCE CARE PLANNING  Never done    MAMMO SCREENING  Never done    DTAP/TDAP/TD IMMUNIZATION (6 - Td or Tdap) 05/25/2020    COVID-19 Vaccine (1 - 2023-24 season) Never done       Immunization History   Administered Date(s) Administered    HepB 05/27/1999, 08/25/2009, 05/25/2010    HepB, Unspecified 05/27/1999, 08/25/2009, 05/25/2010 "    Historical DTP/aP 05/27/1999    MMR 05/27/1999, 08/25/2009    Mantoux Tuberculin Skin Test 03/25/2005    Polio, Unspecified 05/27/1999    Poliovirus, inactivated (IPV) 05/27/1999    TD,PF 7+ (Tenivac) 09/11/2003, 05/25/2010    TDAP (Adacel,Boostrix) 08/25/2009    Td (Adult), Adsorbed 09/11/2003, 05/25/2010    Varicella 08/25/2009, 05/25/2010       Lab Results   Component Value Date    PAP NIL 06/08/2016       Recent Labs   Lab Test 04/08/24  0916 08/04/20  1655 04/16/18  1539   *  --   --    HDL 53  --   --    TRIG 75  --   --    ALT  --  23 17.3   CR 0.44* 0.51* 0.4*   GFRESTIMATED >90 >90 >90   GFRESTBLACK  --  >90 >90   ALBUMIN  --  4.2  --    POTASSIUM 4.1 3.1* 3.3           4/8/2024     8:39 AM 5/16/2023     9:41 AM   PHQ-2 ( 1999 Pfizer)   Q1: Little interest or pleasure in doing things 0    Q2: Feeling down, depressed or hopeless 0    PHQ-2 Score 0    PHQ-2 Score  Incomplete            No data to display                     No data to display                     No data to display                Cmaille Chaudhary, EMT    August 1, 2024 1:06 PM

## 2024-08-02 ENCOUNTER — ANESTHESIA EVENT (OUTPATIENT)
Dept: SURGERY | Facility: AMBULATORY SURGERY CENTER | Age: 39
End: 2024-08-02
Payer: COMMERCIAL

## 2024-08-02 LAB
ALBUMIN SERPL BCG-MCNC: 4.7 G/DL (ref 3.5–5.2)
ALP SERPL-CCNC: 76 U/L (ref 40–150)
ALT SERPL W P-5'-P-CCNC: 18 U/L (ref 0–50)
ANION GAP SERPL CALCULATED.3IONS-SCNC: 8 MMOL/L (ref 7–15)
AST SERPL W P-5'-P-CCNC: 18 U/L (ref 0–45)
BILIRUB SERPL-MCNC: 0.3 MG/DL
BUN SERPL-MCNC: 7.5 MG/DL (ref 6–20)
CALCIUM SERPL-MCNC: 8.8 MG/DL (ref 8.8–10.4)
CHLORIDE SERPL-SCNC: 106 MMOL/L (ref 98–107)
CREAT SERPL-MCNC: 0.5 MG/DL (ref 0.51–0.95)
EGFRCR SERPLBLD CKD-EPI 2021: >90 ML/MIN/1.73M2
GLUCOSE SERPL-MCNC: 99 MG/DL (ref 70–99)
HCO3 SERPL-SCNC: 25 MMOL/L (ref 22–29)
POTASSIUM SERPL-SCNC: 4.3 MMOL/L (ref 3.4–5.3)
PROT SERPL-MCNC: 7.4 G/DL (ref 6.4–8.3)
SODIUM SERPL-SCNC: 139 MMOL/L (ref 135–145)

## 2024-08-02 RX ORDER — DEXAMETHASONE SODIUM PHOSPHATE 10 MG/ML
4 INJECTION, SOLUTION INTRAMUSCULAR; INTRAVENOUS
Status: CANCELLED | OUTPATIENT
Start: 2024-08-02

## 2024-08-02 RX ORDER — HYDROMORPHONE HYDROCHLORIDE 1 MG/ML
0.2 INJECTION, SOLUTION INTRAMUSCULAR; INTRAVENOUS; SUBCUTANEOUS EVERY 5 MIN PRN
Status: CANCELLED | OUTPATIENT
Start: 2024-08-02

## 2024-08-02 RX ORDER — ONDANSETRON 4 MG/1
4 TABLET, ORALLY DISINTEGRATING ORAL EVERY 30 MIN PRN
Status: CANCELLED | OUTPATIENT
Start: 2024-08-02

## 2024-08-02 RX ORDER — FENTANYL CITRATE 50 UG/ML
25 INJECTION, SOLUTION INTRAMUSCULAR; INTRAVENOUS EVERY 5 MIN PRN
Status: CANCELLED | OUTPATIENT
Start: 2024-08-02

## 2024-08-02 RX ORDER — HYDROMORPHONE HYDROCHLORIDE 1 MG/ML
0.4 INJECTION, SOLUTION INTRAMUSCULAR; INTRAVENOUS; SUBCUTANEOUS EVERY 5 MIN PRN
Status: CANCELLED | OUTPATIENT
Start: 2024-08-02

## 2024-08-02 RX ORDER — SODIUM CHLORIDE, SODIUM LACTATE, POTASSIUM CHLORIDE, CALCIUM CHLORIDE 600; 310; 30; 20 MG/100ML; MG/100ML; MG/100ML; MG/100ML
INJECTION, SOLUTION INTRAVENOUS CONTINUOUS
Status: CANCELLED | OUTPATIENT
Start: 2024-08-02

## 2024-08-02 RX ORDER — FENTANYL CITRATE 50 UG/ML
50 INJECTION, SOLUTION INTRAMUSCULAR; INTRAVENOUS EVERY 5 MIN PRN
Status: CANCELLED | OUTPATIENT
Start: 2024-08-02

## 2024-08-02 RX ORDER — ONDANSETRON 2 MG/ML
4 INJECTION INTRAMUSCULAR; INTRAVENOUS EVERY 30 MIN PRN
Status: CANCELLED | OUTPATIENT
Start: 2024-08-02

## 2024-08-02 RX ORDER — NALOXONE HYDROCHLORIDE 0.4 MG/ML
0.1 INJECTION, SOLUTION INTRAMUSCULAR; INTRAVENOUS; SUBCUTANEOUS
Status: CANCELLED | OUTPATIENT
Start: 2024-08-02

## 2024-08-03 LAB — BACTERIA UR CULT: ABNORMAL

## 2024-08-04 ENCOUNTER — TELEPHONE (OUTPATIENT)
Dept: UROLOGY | Facility: CLINIC | Age: 39
End: 2024-08-04
Payer: COMMERCIAL

## 2024-08-04 DIAGNOSIS — N30.90 CYSTITIS: Primary | ICD-10-CM

## 2024-08-04 RX ORDER — NITROFURANTOIN 25; 75 MG/1; MG/1
100 CAPSULE ORAL 2 TIMES DAILY
Qty: 10 CAPSULE | Refills: 0 | Status: SHIPPED | OUTPATIENT
Start: 2024-08-04 | End: 2024-09-05

## 2024-08-04 ASSESSMENT — LIFESTYLE VARIABLES: TOBACCO_USE: 1

## 2024-08-04 NOTE — TELEPHONE ENCOUNTER
August 4, 2024    Patient called today because of her urine culture that resulted last night.  She reports urine symptom of odor that is different.  Given the urinalysis with pyuria and a positive urine culture I have sent a prescription for macrobid to her pharmacy and we will plan to reschedule her procedure.  Questions answered at this time and shhe has expressed understanding of the plan    Hortensia Mcclellan MD MPH  (she/her/hers)   of Urology  [facility]

## 2024-08-04 NOTE — ANESTHESIA PREPROCEDURE EVALUATION
Anesthesia Pre-Procedure Evaluation    Patient: Uday Stauffer   MRN: [id number] : 1985        Procedure : Procedure(s):  CYSTOSCOPY, WITH POSSIBLE BLADDER BIOPSY          Past Medical History:   Diagnosis Date    Gonococcal infection 2013      Past Surgical History:   Procedure Laterality Date    CYSTOSCOPY        Allergies   Allergen Reactions    No Known Drug Allergy       Social History     Tobacco Use    Smoking status: Former    Smokeless tobacco: Never    Tobacco comments:     quit in 2019   Substance Use Topics    Alcohol use: Yes     Comment: once a month      Wt Readings from Last 1 Encounters:   24 60.6 kg (133 lb 11.2 oz)        Anesthesia Evaluation            ROS/MED HX  ENT/Pulmonary:     (+)                tobacco use,                        Neurologic:     (+)      migraines,                          Cardiovascular:       METS/Exercise Tolerance:     Hematologic:       Musculoskeletal:       GI/Hepatic:       Renal/Genitourinary: Comment: Hematuria      Endo:       Psychiatric/Substance Use:       Infectious Disease:       Malignancy:       Other:            Physical Exam    Airway        Mallampati: II       Respiratory Devices and Support         Dental       (+) Minor Abnormalities - some fillings, tiny chips      Cardiovascular          Rhythm and rate: regular     Pulmonary                   OUTSIDE LABS:  CBC:   Lab Results   Component Value Date    WBC 10.6 2024    WBC 12.2 (H) 2020    HGB 13.9 2024    HGB 14.6 2020    HCT 43.9 2024    HCT 45.6 2020     2024     2020     BMP:   Lab Results   Component Value Date     2024     2024    POTASSIUM 4.3 2024    POTASSIUM 4.1 2024    CHLORIDE 106 2024    CHLORIDE 104 2024    CO2 25 2024    CO2 24 2024    BUN 7.5 2024    BUN 11.6 2024    CR 0.50 (L) 2024    CR 0.44 (L) 2024    GLC 99  "08/01/2024     (H) 04/08/2024     COAGS: No results found for: \"PTT\", \"INR\", \"FIBR\"  POC:   Lab Results   Component Value Date    HCG Negative 01/25/2024     HEPATIC:   Lab Results   Component Value Date    ALBUMIN 4.7 08/01/2024    PROTTOTAL 7.4 08/01/2024    ALT 18 08/01/2024    AST 18 08/01/2024    ALKPHOS 76 08/01/2024    BILITOTAL 0.3 08/01/2024     OTHER:   Lab Results   Component Value Date    SAAD 8.8 08/01/2024       Anesthesia Plan    ASA Status:  2    NPO Status:  NPO Appropriate    Anesthesia Type: MAC.     - Reason for MAC: immobility needed              Consents    Anesthesia Plan(s) and associated risks, benefits, and realistic alternatives discussed. Questions answered and patient/representative(s) expressed understanding.     - Discussed:     - Discussed with:  Patient            Postoperative Care            Comments:               Roger Ernst MD    I have reviewed the pertinent notes and labs in the chart from the past 30 days and (re)examined the patient.  Any updates or changes from those notes are reflected in this note.              # Overweight: Estimated body mass index is 26.02 kg/m  as calculated from the following:    Height as of 8/1/24: 1.527 m (5' 0.1\").    Weight as of 8/1/24: 60.6 kg (133 lb 11.2 oz).      "

## 2024-08-05 ENCOUNTER — TELEPHONE (OUTPATIENT)
Dept: UROLOGY | Facility: CLINIC | Age: 39
End: 2024-08-05
Payer: COMMERCIAL

## 2024-08-05 ENCOUNTER — ANESTHESIA (OUTPATIENT)
Dept: SURGERY | Facility: AMBULATORY SURGERY CENTER | Age: 39
End: 2024-08-05
Payer: COMMERCIAL

## 2024-08-05 DIAGNOSIS — N39.0 URINARY TRACT INFECTION: Primary | ICD-10-CM

## 2024-08-05 RX ORDER — NITROFURANTOIN 25; 75 MG/1; MG/1
100 CAPSULE ORAL 2 TIMES DAILY
Qty: 8 CAPSULE | Refills: 0 | Status: CANCELLED | OUTPATIENT
Start: 2024-08-08 | End: 2024-08-12

## 2024-08-05 RX ORDER — NITROFURANTOIN 25; 75 MG/1; MG/1
100 CAPSULE ORAL 2 TIMES DAILY
Qty: 6 CAPSULE | Refills: 0 | Status: SHIPPED | OUTPATIENT
Start: 2024-08-09 | End: 2024-08-12

## 2024-08-05 NOTE — TELEPHONE ENCOUNTER
Left message to let the patient know she is scheduled for surgery 8/12 at the surgery center [facility] with Dr Mcclellan. Patient was sent more antibiotics to her pharmacy which she needs to continue taking until the day of surgery     Gay Aguirre RN, BSN  Care Coordinator Urology  [facility], [place]  Urology Clinic  [phone]

## 2024-08-05 NOTE — TELEPHONE ENCOUNTER
Patient has been rescheduled to 8/12. Per SENIA Rasihd, they are going to reach out to the patient to discuss and go over the prescribed antibiotic. Updated surgery packet was sent to patient. Jaimee Genao on 8/5/2024 at 3:09 PM

## 2024-08-06 NOTE — TELEPHONE ENCOUNTER
Left voicemail for the patient   Gay Aguirre, RN, BSN  Care Coordinator Urology  [facility], [place]  Urology Clinic  [phone]

## 2024-08-07 NOTE — TELEPHONE ENCOUNTER
Received message from PAN that patient was looking to possible move procedure date. Writer called patient back. New DOS date is 9/9 with Dr. Carolina Genao on 8/7/2024 at 12:20 PM

## 2024-08-30 DIAGNOSIS — N39.0 URINARY TRACT INFECTION: Primary | ICD-10-CM

## 2024-08-30 NOTE — PROGRESS NOTES
Notified patient to she needs urine done 9/3 which is scheduled   PAC also scheduled for 9/5/24  Patient verbalized understanding    Gay Aguirre, RN, BSN  Care Coordinator Urology  [facility], [place]  Urology Clinic  [phone]

## 2024-09-03 ENCOUNTER — LAB (OUTPATIENT)
Dept: LAB | Facility: CLINIC | Age: 39
End: 2024-09-03
Payer: COMMERCIAL

## 2024-09-03 DIAGNOSIS — N39.0 URINARY TRACT INFECTION: ICD-10-CM

## 2024-09-03 LAB
ALBUMIN UR-MCNC: NEGATIVE MG/DL
APPEARANCE UR: CLEAR
BILIRUB UR QL STRIP: NEGATIVE
COLOR UR AUTO: ABNORMAL
GLUCOSE UR STRIP-MCNC: NEGATIVE MG/DL
HGB UR QL STRIP: NEGATIVE
KETONES UR STRIP-MCNC: NEGATIVE MG/DL
LEUKOCYTE ESTERASE UR QL STRIP: ABNORMAL
NITRATE UR QL: NEGATIVE
PH UR STRIP: 6 [PH] (ref 5–7)
RBC URINE: 2 /HPF
SP GR UR STRIP: 1.01 (ref 1–1.03)
SQUAMOUS EPITHELIAL: <1 /HPF
UROBILINOGEN UR STRIP-MCNC: NORMAL MG/DL
WBC URINE: 6 /HPF

## 2024-09-03 PROCEDURE — 99000 SPECIMEN HANDLING OFFICE-LAB: CPT | Performed by: PATHOLOGY

## 2024-09-03 PROCEDURE — 87086 URINE CULTURE/COLONY COUNT: CPT | Performed by: UROLOGY

## 2024-09-03 PROCEDURE — 81001 URINALYSIS AUTO W/SCOPE: CPT | Performed by: PATHOLOGY

## 2024-09-03 NOTE — TELEPHONE ENCOUNTER
FUTURE VISIT INFORMATION      SURGERY INFORMATION:  Date: 9/9/24  [facility]  Surgeon:  Hortensia Mcclellan MD   Anesthesia Type:  choice  Procedure: CYSTOSCOPY, WITH POSSIBLE BLADDER BIOPSY   Consult: ov 5/29/24    RECORDS REQUESTED FROM:       Primary Care Provider: [facility]

## 2024-09-04 LAB — BACTERIA UR CULT: NORMAL

## 2024-09-05 ENCOUNTER — PRE VISIT (OUTPATIENT)
Dept: SURGERY | Facility: CLINIC | Age: 39
End: 2024-09-05

## 2024-09-05 ENCOUNTER — OFFICE VISIT (OUTPATIENT)
Dept: SURGERY | Facility: CLINIC | Age: 39
End: 2024-09-05
Payer: COMMERCIAL

## 2024-09-05 VITALS
HEIGHT: 60 IN | SYSTOLIC BLOOD PRESSURE: 124 MMHG | RESPIRATION RATE: 16 BRPM | DIASTOLIC BLOOD PRESSURE: 86 MMHG | WEIGHT: 129 LBS | BODY MASS INDEX: 25.32 KG/M2 | OXYGEN SATURATION: 99 % | TEMPERATURE: 98.2 F | HEART RATE: 62 BPM

## 2024-09-05 DIAGNOSIS — R31.0 GROSS HEMATURIA: ICD-10-CM

## 2024-09-05 DIAGNOSIS — Z01.818 PRE-OPERATIVE EXAMINATION: Primary | ICD-10-CM

## 2024-09-05 PROCEDURE — 99202 OFFICE O/P NEW SF 15 MIN: CPT | Performed by: PHYSICIAN ASSISTANT

## 2024-09-05 ASSESSMENT — PAIN SCALES - GENERAL: PAINLEVEL: NO PAIN (0)

## 2024-09-05 ASSESSMENT — LIFESTYLE VARIABLES: TOBACCO_USE: 1

## 2024-09-05 ASSESSMENT — ENCOUNTER SYMPTOMS: SEIZURES: 0

## 2024-09-05 NOTE — H&P
Pre-Operative H & P     CC:  Preoperative exam to assess for increased cardiopulmonary risk while undergoing surgery and anesthesia.    Date of Encounter: 9/5/2024  Primary Care Physician:  Betsey Kovacs     Reason for visit:   Encounter Diagnoses   Name Primary?    Pre-operative examination Yes    Gross hematuria        HPI  Uday Stauffer is a 39 year old female who presents for pre-operative H & P in preparation for  Procedure Information       Case: [id number] Date/Time: 09/09/24 1055    Procedure: CYSTOSCOPY, WITH POSSIBLE BLADDER BIOPSY (Urethra)    Anesthesia type: MAC    Diagnosis:       Gross hematuria [R31.0]      Tobacco use disorder [F17.200]    Pre-op diagnosis:       Gross hematuria [R31.0]      Tobacco use disorder [F17.200]    Location: [place] [address] / [facility]    Providers: Hortensia Mcclellan MD            The patient is a 39 year old woman who has a past medical history significant for former smoker, migraines, hematuria, alopecia, LTBI and left rotator cuff tear. The patient was seen by Dr. Mcclellan for hematuria. She was last seen on 5/29/24 and counseled for the procedure as above. Given her urinalysis and urine culture on 8/1/24 for her H&P by her PCP her procedure was delayed for treatment of her UTI. She is now rescheduled for the procedure as above.     History is obtained from the patient and chart review    Hx of abnormal bleeding or anti-platelet use: none    Menstrual history: Patient's last menstrual period was 08/21/2024 (within weeks).:      Past Medical History  Past Medical History:   Diagnosis Date    Alopecia     Gonococcal infection 03/01/2013    Hematuria     LTBI (latent tuberculosis infection)     Migraine     Rotator cuff injury        Past Surgical History  Past Surgical History:   Procedure Laterality Date    APPENDECTOMY      C/SECTION, CLASSICAL      CYSTOSCOPY         Prior to Admission Medications  No current outpatient  medications on file.       Allergies  Allergies   Allergen Reactions    No Known Drug Allergy        Social History  Social History     Socioeconomic History    Marital status: Single     Spouse name: Not on file    Number of children: Not on file    Years of education: Not on file    Highest education level: Not on file   Occupational History    Not on file   Tobacco Use    Smoking status: Former    Smokeless tobacco: Never    Tobacco comments:     quit in 2019   Substance and Sexual Activity    Alcohol use: Yes     Comment: once a month    Drug use: No    Sexual activity: Yes     Partners: Male     Birth control/protection: Injection   Other Topics Concern    Parent/sibling w/ CABG, MI or angioplasty before 65F 55M? Not Asked   Social History Narrative    Not on file     Social Determinants of Health     Financial Resource Strain: Low Risk  (8/1/2024)    Financial Resource Strain     Within the past 12 months, have you or your family members you live with been unable to get utilities (heat, electricity) when it was really needed?: No   Food Insecurity: High Risk (8/1/2024)    Food Insecurity     Within the past 12 months, did you worry that your food would run out before you got money to buy more?: Yes     Within the past 12 months, did the food you bought just not last and you didn t have money to get more?: Yes   Transportation Needs: Low Risk  (8/1/2024)    Transportation Needs     Within the past 12 months, has lack of transportation kept you from medical appointments, getting your medicines, non-medical meetings or appointments, work, or from getting things that you need?: No   Physical Activity: Not on File (3/13/2020)    Received from [facility]    Physical Activity     Physical Activity: 0   Stress: Not on File (3/13/2020)    Received from [facility]    Stress     Stress: 0   Social Connections: Not on File (3/13/2020)    Received from [facility]    Social Connections     Social Connections and  Isolation: 0   Interpersonal Safety: Low Risk  (8/1/2024)    Interpersonal Safety     Do you feel physically and emotionally safe where you currently live?: Yes     Within the past 12 months, have you been hit, slapped, kicked or otherwise physically hurt by someone?: No     Within the past 12 months, have you been humiliated or emotionally abused in other ways by your partner or ex-partner?: No   Housing Stability: High Risk (8/1/2024)    Housing Stability     Do you have housing? : No     Are you worried about losing your housing?: No       Family History  Family History   Problem Relation Age of Onset    Diabetes Father     Anesthesia Reaction No family hx of     Deep Vein Thrombosis (DVT) No family hx of        Review of Systems  The complete review of systems is negative other than noted in the HPI or here.   Anesthesia Evaluation   Pt has had prior anesthetic. Type: General and Regional.        ROS/MED HX  ENT/Pulmonary:     (+)                tobacco use, Past use,                       Neurologic:     (+)      migraines,                       (-) no seizures, no CVA and no TIA   Cardiovascular:       METS/Exercise Tolerance: >4 METS    Hematologic:  - neg hematologic  ROS     Musculoskeletal: Comment: Left rotator cuff tear      GI/Hepatic:  - neg GI/hepatic ROS  (-) GERD   Renal/Genitourinary: Comment: Hematuria       Endo:  - neg endo ROS     Psychiatric/Substance Use:  - neg psychiatric ROS     Infectious Disease: Comment: LTBI - on treatment       Malignancy:  - neg malignancy ROS     Other:  - neg other ROS          /86 (BP Location: Right arm, Patient Position: Sitting, Cuff Size: Adult Regular)   Pulse 62   Temp 98.2  F (36.8  C) (Oral)   Resp 16   Ht 1.524 m (5')   Wt 58.5 kg (129 lb)   LMP 08/21/2024 (Within Weeks)   SpO2 99%   Breastfeeding No   BMI 25.19 kg/m      Physical Exam   Constitutional: Awake, alert, cooperative, no apparent distress, and appears stated age.  Eyes: Pupils  equal, round and reactive to light, extra ocular muscles intact, sclera clear, conjunctiva normal.  HENT: Normocephalic, oral pharynx with moist mucus membranes, good dentition. No goiter appreciated.   Respiratory: Clear to auscultation bilaterally, no crackles or wheezing.  Cardiovascular: Regular rate and rhythm, normal S1 and S2, and no murmur noted.  Carotids +2, no bruits. No edema. Palpable pulses to radial  DP and PT arteries.   GI: Normal bowel sounds, soft, non-distended, non-tender, no masses palpated, no hepatosplenomegaly.    Lymph/Hematologic: No cervical lymphadenopathy and no supraclavicular lymphadenopathy.  Genitourinary:  defer  Skin: Warm and dry.  No rashes at anticipated surgical site.   Musculoskeletal: Full ROM of neck. There is no redness, warmth, or swelling of the joints. Gross motor strength is normal.    Neurologic: Awake, alert, oriented to name, place and time. Cranial nerves II-XII are grossly intact. Gait is normal.   Neuropsychiatric: Calm, cooperative. Normal affect.     Prior Labs/Diagnostic Studies   All labs and imaging personally reviewed    Latest Reference Range & Units 08/01/24 13:47   Sodium 135 - 145 mmol/L 139   Potassium 3.4 - 5.3 mmol/L 4.3   Chloride 98 - 107 mmol/L 106   Carbon Dioxide (CO2) 22 - 29 mmol/L 25   Urea Nitrogen 6.0 - 20.0 mg/dL 7.5   Creatinine 0.51 - 0.95 mg/dL 0.50 (L)   GFR Estimate >60 mL/min/1.73m2 >90   Calcium 8.8 - 10.4 mg/dL 8.8   Anion Gap 7 - 15 mmol/L 8   Albumin 3.5 - 5.2 g/dL 4.7   Protein Total 6.4 - 8.3 g/dL 7.4   Alkaline Phosphatase 40 - 150 U/L 76   ALT 0 - 50 U/L 18   AST 0 - 45 U/L 18   Bilirubin Total <=1.2 mg/dL 0.3   Glucose 70 - 99 mg/dL 99   WBC 4.0 - 11.0 10e3/uL 10.6   Hemoglobin 11.7 - 15.7 g/dL 13.9   Hematocrit 35.0 - 47.0 % 43.9   Platelet Count 150 - 450 10e3/uL 272   RBC Count 3.80 - 5.20 10e6/uL 4.91   MCV 78 - 100 fL 89   MCH 26.5 - 33.0 pg 28.3   MCHC 31.5 - 36.5 g/dL 31.7   RDW 10.0 - 15.0 % 13.5   % Neutrophils % 70    % Lymphocytes % 21   % Monocytes % 7   % Eosinophils % 1   % Basophils % 0   Absolute Basophils 0.0 - 0.2 10e3/uL 0.0   Absolute Eosinophils 0.0 - 0.7 10e3/uL 0.1   Absolute Immature Granulocytes <=0.4 10e3/uL 0.1   Absolute Lymphocytes 0.8 - 5.3 10e3/uL 2.2   Absolute Monocytes 0.0 - 1.3 10e3/uL 0.8   % Immature Granulocytes % 1   Absolute Neutrophils 1.6 - 8.3 10e3/uL 7.4   Absolute NRBCs 10e3/uL 0.0   NRBCs per 100 WBC <1 /100 0   (L): Data is abnormally low  EKG/ stress test - if available please see in ROS above     The patient's records and results personally reviewed by this provider.     Outside records reviewed from: Care Everywhere      Assessment    Uday Stauffer is a 39 year old female seen as a PAC referral for risk assessment and optimization for anesthesia.    Plan/Recommendations  Pt will be optimized for the proposed procedure.  See below for details on the assessment, risk, and preoperative recommendations    NEUROLOGY  - No history of TIA, CVA or seizure  - Migraines - occasional headaches  -Post Op delirium risk factors:  No risk identified    ENT  - No current airway concerns.  Will need to be reassessed day of surgery.  Mallampati: I  TM: > 3    CARDIAC  - No history of CAD, Hypertension, and Afib  - METS (Metabolic Equivalents)  Patient performs 4 or more METS exercise without symptoms             Total Score: 0      RCRI-Very low risk: Class 1 0.4% complication rate             Total Score: 0        PULMONARY  - Obstructive Sleep Apnea  No current risk of obstructive sleep apnea   ESCOBAR Low Risk             Total Score: 0      - Denies asthma or inhaler use  - Tobacco History    History   Smoking Status    Former   Smokeless Tobacco    Never       GI  ~ Denies GERD  PONV Medium Risk  Total Score: 2           1 AN PONV: Pt is Female    1 AN PONV: Patient is not a current smoker        /RENAL  - Baseline Creatinine  0.50  ~ Hematuria - procedure as above.     ENDOCRINE    - BMI: Estimated  body mass index is 25.19 kg/m  as calculated from the following:    Height as of this encounter: 1.524 m (5').    Weight as of this encounter: 58.5 kg (129 lb).  Overweight (BMI 25.0-29.9)  - No history of Diabetes Mellitus    HEME  VTE Low Risk 0.26%             Total Score: 0      - No history of abnormal bleeding or antiplatelet use.      MSK  Patient is NOT Frail             Total Score: 0      ~ Left rotator cuff tear      ID  ~ LTBI - seen by ID and completed treatment     Different anesthesia methods/types have been discussed with the patient, but they are aware that the final plan will be decided by the assigned anesthesia provider on the date of service.  The patient is optimized for their procedure. AVS with information on surgery time/arrival time, meds and NPO status given by nursing staff. No further diagnostic testing indicated.      On the day of service:     Prep time: 12 minutes  Visit time: 10 minutes  Documentation time: 7 minutes  ------------------------------------------  Total time: 29 minutes      Zoey Chavez PA-C  Preoperative Assessment Center  [facility]  Clinic and Surgery Center  Phone: [phone]  Fax: [phone]

## 2024-09-05 NOTE — PATIENT INSTRUCTIONS
Preparing for Your Surgery      Name:  Uday Stauffer   MRN:  [id number]   :  1985   Today's Date:  2024         Arriving for surgery:  Surgery date:  24  Arrival time:  9:25 am  Surgery time: 10:55 am    Restrictions due to COVID 19:    Please maintain social distance.  Masking is optional        parking is available for anyone with mobility limitations or disabilities. (Monday- Friday 7 am- 5 pm)    Please come to:    [facility]  [address] [place] [id number]    Check in on the 5th floor, Ambulatory Surgery Center.    What can I eat or drink?    -  You may eat and drink normally until 8 hours before arrival time  (Until 1:25 am)  -  You may have clear liquids until 2 hours before arrival time  (Until 7:25 am)    Examples of clear liquids:  Water  Clear broth  Juices (apple, white grape, white cranberry  and cider) without pulp  Noncarbonated, powder based beverages  (lemonade and Per-Aid)  Sodas (Sprite, 7-Up, ginger ale and seltzer)  Coffee or tea (without milk or cream)  Gatorade    --No alcohol or cannabis products for at least 24 hours before surgery    Which medicines can I take?    Hold Aspirin for 7 days before surgery.   Hold Multivitamins for 7 days before surgery.  Hold Supplements for 7 days before surgery.  Hold Ibuprofen (Advil, Motrin) for 1 day before surgery--unless otherwise directed by surgeon.  Hold Naproxen (Aleve) for 4 days before surgery.      How do I prepare myself?  - Please take 2 showers (one the night prior to surgery and one the morning of surgery) using Scrubcare or Hibiclens soap.    Use this soap only from the neck to your toes. Do not use in genital area.     Leave the soap on your skin for one minute--then rinse thoroughly.      You may use your own shampoo and conditioner; no other hair products.      Sleep in clean sheets and wear clean clothes.  - Please remove all jewelry and body piercings.  - No lotions,  deodorants or fragrance.  - No makeup or fingernail polish.   - Bring your ID and insurance card.    -If you have a Deep Brain Stimulator, a Spinal Cord Stimulator or any implanted Neuro device you must bring the remote to the Surgery Center        ALL PATIENTS ARE REQUIRED TO HAVE A RESPONSIBLE ADULT TO DRIVE AND BE IN ATTENDANCE WITH THEM FOR 24 HOURS FOLLOWING SURGERY       Covid testing policy as of 12/06/2022  Your surgeon will notify and schedule you for a COVID test if one is needed before surgery--please direct any questions or COVID symptoms to your surgeon      Questions or Concerns:    -For questions regarding the day of surgery please contact the Ambulatory Surgery Center at [phone].    -If you have health changes between today and your surgery please contact your surgeon.     For questions after surgery please call your surgeons office.

## 2024-09-06 RX ORDER — HYDROMORPHONE HYDROCHLORIDE 1 MG/ML
0.2 INJECTION, SOLUTION INTRAMUSCULAR; INTRAVENOUS; SUBCUTANEOUS EVERY 5 MIN PRN
Status: CANCELLED | OUTPATIENT
Start: 2024-09-06

## 2024-09-06 RX ORDER — ONDANSETRON 4 MG/1
4 TABLET, ORALLY DISINTEGRATING ORAL EVERY 30 MIN PRN
Status: CANCELLED | OUTPATIENT
Start: 2024-09-06

## 2024-09-06 RX ORDER — SODIUM CHLORIDE, SODIUM LACTATE, POTASSIUM CHLORIDE, CALCIUM CHLORIDE 600; 310; 30; 20 MG/100ML; MG/100ML; MG/100ML; MG/100ML
INJECTION, SOLUTION INTRAVENOUS CONTINUOUS
Status: CANCELLED | OUTPATIENT
Start: 2024-09-06

## 2024-09-06 RX ORDER — DEXAMETHASONE SODIUM PHOSPHATE 10 MG/ML
4 INJECTION, SOLUTION INTRAMUSCULAR; INTRAVENOUS
Status: CANCELLED | OUTPATIENT
Start: 2024-09-06

## 2024-09-06 RX ORDER — HYDROMORPHONE HYDROCHLORIDE 1 MG/ML
0.4 INJECTION, SOLUTION INTRAMUSCULAR; INTRAVENOUS; SUBCUTANEOUS EVERY 5 MIN PRN
Status: CANCELLED | OUTPATIENT
Start: 2024-09-06

## 2024-09-06 RX ORDER — FENTANYL CITRATE 50 UG/ML
50 INJECTION, SOLUTION INTRAMUSCULAR; INTRAVENOUS EVERY 5 MIN PRN
Status: CANCELLED | OUTPATIENT
Start: 2024-09-06

## 2024-09-06 RX ORDER — LABETALOL HYDROCHLORIDE 5 MG/ML
10 INJECTION, SOLUTION INTRAVENOUS
Status: CANCELLED | OUTPATIENT
Start: 2024-09-06

## 2024-09-06 RX ORDER — FENTANYL CITRATE 50 UG/ML
25 INJECTION, SOLUTION INTRAMUSCULAR; INTRAVENOUS EVERY 5 MIN PRN
Status: CANCELLED | OUTPATIENT
Start: 2024-09-06

## 2024-09-06 RX ORDER — ONDANSETRON 2 MG/ML
4 INJECTION INTRAMUSCULAR; INTRAVENOUS EVERY 30 MIN PRN
Status: CANCELLED | OUTPATIENT
Start: 2024-09-06

## 2024-09-06 RX ORDER — NALOXONE HYDROCHLORIDE 0.4 MG/ML
0.1 INJECTION, SOLUTION INTRAMUSCULAR; INTRAVENOUS; SUBCUTANEOUS
Status: CANCELLED | OUTPATIENT
Start: 2024-09-06

## 2024-09-09 ENCOUNTER — HOSPITAL ENCOUNTER (OUTPATIENT)
Facility: AMBULATORY SURGERY CENTER | Age: 39
Discharge: HOME | End: 2024-09-09
Attending: UROLOGY
Payer: COMMERCIAL

## 2024-09-09 VITALS
DIASTOLIC BLOOD PRESSURE: 80 MMHG | SYSTOLIC BLOOD PRESSURE: 140 MMHG | HEIGHT: 60 IN | OXYGEN SATURATION: 100 % | HEART RATE: 90 BPM | TEMPERATURE: 97 F | RESPIRATION RATE: 18 BRPM | BODY MASS INDEX: 25.32 KG/M2 | WEIGHT: 129 LBS

## 2024-09-09 PROCEDURE — 52000 CYSTOURETHROSCOPY: CPT | Mod: GC | Performed by: UROLOGY

## 2024-09-09 PROCEDURE — 52204 CYSTOSCOPY W/BIOPSY(S): CPT | Performed by: ANESTHESIOLOGY

## 2024-09-09 PROCEDURE — 52000 CYSTOURETHROSCOPY: CPT | Performed by: UROLOGY

## 2024-09-09 PROCEDURE — 52204 CYSTOSCOPY W/BIOPSY(S): CPT | Performed by: NURSE ANESTHETIST, CERTIFIED REGISTERED

## 2024-09-09 RX ORDER — SODIUM CHLORIDE, SODIUM LACTATE, POTASSIUM CHLORIDE, CALCIUM CHLORIDE 600; 310; 30; 20 MG/100ML; MG/100ML; MG/100ML; MG/100ML
INJECTION, SOLUTION INTRAVENOUS CONTINUOUS
Status: DISCONTINUED | OUTPATIENT
Start: 2024-09-09 | End: 2024-09-10 | Stop reason: HOSPADM

## 2024-09-09 RX ORDER — OXYCODONE HYDROCHLORIDE 5 MG/1
10 TABLET ORAL
Status: DISCONTINUED | OUTPATIENT
Start: 2024-09-09 | End: 2024-09-10 | Stop reason: HOSPADM

## 2024-09-09 RX ORDER — CEFAZOLIN SODIUM 2 G/50ML
2 SOLUTION INTRAVENOUS SEE ADMIN INSTRUCTIONS
Status: DISCONTINUED | OUTPATIENT
Start: 2024-09-09 | End: 2024-09-10 | Stop reason: HOSPADM

## 2024-09-09 RX ORDER — ACETAMINOPHEN 325 MG/1
975 TABLET ORAL ONCE
Status: DISCONTINUED | OUTPATIENT
Start: 2024-09-09 | End: 2024-09-10 | Stop reason: HOSPADM

## 2024-09-09 RX ORDER — ONDANSETRON 2 MG/ML
INJECTION INTRAMUSCULAR; INTRAVENOUS PRN
Status: DISCONTINUED | OUTPATIENT
Start: 2024-09-09 | End: 2024-09-09

## 2024-09-09 RX ORDER — ONDANSETRON 2 MG/ML
4 INJECTION INTRAMUSCULAR; INTRAVENOUS EVERY 30 MIN PRN
Status: DISCONTINUED | OUTPATIENT
Start: 2024-09-09 | End: 2024-09-10 | Stop reason: HOSPADM

## 2024-09-09 RX ORDER — NALOXONE HYDROCHLORIDE 0.4 MG/ML
0.1 INJECTION, SOLUTION INTRAMUSCULAR; INTRAVENOUS; SUBCUTANEOUS
Status: DISCONTINUED | OUTPATIENT
Start: 2024-09-09 | End: 2024-09-10 | Stop reason: HOSPADM

## 2024-09-09 RX ORDER — ONDANSETRON 4 MG/1
4 TABLET, ORALLY DISINTEGRATING ORAL EVERY 30 MIN PRN
Status: DISCONTINUED | OUTPATIENT
Start: 2024-09-09 | End: 2024-09-10 | Stop reason: HOSPADM

## 2024-09-09 RX ORDER — LIDOCAINE 40 MG/G
CREAM TOPICAL
Status: DISCONTINUED | OUTPATIENT
Start: 2024-09-09 | End: 2024-09-10 | Stop reason: HOSPADM

## 2024-09-09 RX ORDER — PROPOFOL 10 MG/ML
INJECTION, EMULSION INTRAVENOUS CONTINUOUS PRN
Status: DISCONTINUED | OUTPATIENT
Start: 2024-09-09 | End: 2024-09-09

## 2024-09-09 RX ORDER — OXYCODONE HYDROCHLORIDE 5 MG/1
5 TABLET ORAL
Status: DISCONTINUED | OUTPATIENT
Start: 2024-09-09 | End: 2024-09-10 | Stop reason: HOSPADM

## 2024-09-09 RX ORDER — DEXAMETHASONE SODIUM PHOSPHATE 10 MG/ML
4 INJECTION, SOLUTION INTRAMUSCULAR; INTRAVENOUS
Status: DISCONTINUED | OUTPATIENT
Start: 2024-09-09 | End: 2024-09-10 | Stop reason: HOSPADM

## 2024-09-09 RX ORDER — CEFAZOLIN SODIUM 2 G/50ML
2 SOLUTION INTRAVENOUS
Status: COMPLETED | OUTPATIENT
Start: 2024-09-09 | End: 2024-09-09

## 2024-09-09 RX ORDER — LIDOCAINE HYDROCHLORIDE 20 MG/ML
JELLY TOPICAL PRN
Status: DISCONTINUED | OUTPATIENT
Start: 2024-09-09 | End: 2024-09-09 | Stop reason: HOSPADM

## 2024-09-09 RX ORDER — PROPOFOL 10 MG/ML
INJECTION, EMULSION INTRAVENOUS PRN
Status: DISCONTINUED | OUTPATIENT
Start: 2024-09-09 | End: 2024-09-09

## 2024-09-09 RX ORDER — LIDOCAINE HYDROCHLORIDE 20 MG/ML
INJECTION, SOLUTION INFILTRATION; PERINEURAL PRN
Status: DISCONTINUED | OUTPATIENT
Start: 2024-09-09 | End: 2024-09-09

## 2024-09-09 RX ORDER — FENTANYL CITRATE 50 UG/ML
INJECTION, SOLUTION INTRAMUSCULAR; INTRAVENOUS PRN
Status: DISCONTINUED | OUTPATIENT
Start: 2024-09-09 | End: 2024-09-09

## 2024-09-09 RX ORDER — ACETAMINOPHEN 325 MG/1
975 TABLET ORAL ONCE
Status: COMPLETED | OUTPATIENT
Start: 2024-09-09 | End: 2024-09-09

## 2024-09-09 RX ORDER — FENTANYL CITRATE 50 UG/ML
25 INJECTION, SOLUTION INTRAMUSCULAR; INTRAVENOUS
Status: DISCONTINUED | OUTPATIENT
Start: 2024-09-09 | End: 2024-09-10 | Stop reason: HOSPADM

## 2024-09-09 RX ADMIN — LIDOCAINE HYDROCHLORIDE 50 MG: 20 INJECTION, SOLUTION INFILTRATION; PERINEURAL at 10:17

## 2024-09-09 RX ADMIN — SODIUM CHLORIDE, SODIUM LACTATE, POTASSIUM CHLORIDE, CALCIUM CHLORIDE: 600; 310; 30; 20 INJECTION, SOLUTION INTRAVENOUS at 10:15

## 2024-09-09 RX ADMIN — PROPOFOL 150 MCG/KG/MIN: 10 INJECTION, EMULSION INTRAVENOUS at 10:17

## 2024-09-09 RX ADMIN — FENTANYL CITRATE 25 MCG: 50 INJECTION, SOLUTION INTRAMUSCULAR; INTRAVENOUS at 10:20

## 2024-09-09 RX ADMIN — FENTANYL CITRATE 25 MCG: 50 INJECTION, SOLUTION INTRAMUSCULAR; INTRAVENOUS at 10:29

## 2024-09-09 RX ADMIN — ACETAMINOPHEN 975 MG: 325 TABLET ORAL at 09:55

## 2024-09-09 RX ADMIN — ONDANSETRON 4 MG: 2 INJECTION INTRAMUSCULAR; INTRAVENOUS at 10:17

## 2024-09-09 RX ADMIN — CEFAZOLIN SODIUM 2 G: 2 SOLUTION INTRAVENOUS at 10:15

## 2024-09-09 RX ADMIN — PROPOFOL 50 MG: 10 INJECTION, EMULSION INTRAVENOUS at 10:17

## 2024-09-09 NOTE — DISCHARGE INSTRUCTIONS
Tylenol 975 mg given at 10:00am.   Ok to take more after 4:00pm.    [facility]  Home Care Following Anesthesia  For 24 hours after surgery:  Get plenty of rest.  A responsible adult must stay with you for at least 24 hours after you leave the surgery center.  Do not drive or use heavy equipment.  If you have weakness or tingling, don't drive or use heavy equipment until this feeling goes away.   Do not drink alcohol.   Avoid strenuous or risky activities.  Ask for help when climbing stairs.  You may feel lightheaded.  IF so, sit for a few minutes before standing.  Have someone help you get up.   If you have nausea (feel sick to your stomach): Drink only clear liquids such as apple juice, ginger ale, broth or 7-Up.  Rest may also help.  Be sure to drink enough fluids.  Move to a regular diet as you feel able.   You may have a slight fever.  Call the doctor if your fever is over 100 F (37.7 C) (taken under the tongue) or lasts longer than 24 hours.  You may have a dry mouth, a sore throat, muscle aches or trouble sleeping. These should go away after 24 hours.  Do not make important or legal decisions.   It is recommended to avoid smoking.        Tips for taking pain medications  To get the best pain relief possible, remember these points:  Take pain medications as directed, before pain becomes severe.  Pain medication can upset your stomach: taking it with food may help.  Constipation is a common side effect of pain medication. Drink plenty of  fluids.  Eat foods high in fiber. Take a stool softener if recommended by your doctor or pharmacist.  Do not drink alcohol, drive or operate machinery while taking pain medications.  Ask about other ways to control pain, such as with heat, ice or relaxation.    Tylenol/Acetaminophen Consumption    If you feel your pain relief is insufficient, you may take Tylenol/Acetaminophen in addition to your narcotic pain medication.   Be careful not  to exceed 4,000 mg of Tylenol/Acetaminophen in a 24 hour period from all sources.  If you are taking extra strength Tylenol/acetaminophen (500 mg), the maximum dose is 8 tablets in 24 hours.  If you are taking regular strength acetaminophen (325 mg), the maximum dose is 12 tablets in 24 hours.    Call a doctor for any of the following:  Signs of infection (fever, growing tenderness at the surgery site, a large amount of drainage or bleeding, severe pain, foul-smelling drainage, redness, swelling).  It has been over 8 to 10 hours since surgery and you are still not able to urinate (pass water).  Headache for over 24 hours.  Numbness, tingling or weakness the day after surgery (if you had spinal anesthesia).  Signs of Covid-19 infection (temperature over 100 degrees, shortness of breath, cough, loss of taste/smell, generalized body aches, persistent headache, chills, sore throat, nausea/vomiting/diarrhea)  Your doctor is:  Dr. Hortensia Mcclellan, Prostate and Urology: [phone]              Or dial [phone] and ask for the resident on call for:  Prostate Urology  For emergency care, call the:  [facility]:  [phone] (TTY for hearing impaired: [phone])

## 2024-09-09 NOTE — ANESTHESIA CARE TRANSFER NOTE
Patient: Uday Stauffer    Procedure: Procedure(s):  CYSTOSCOPY       Diagnosis: Gross hematuria [R31.0]  Tobacco use disorder [F17.200]  Diagnosis Additional Information: No value filed.    Anesthesia Type:   MAC     Note:    Oropharynx: oropharynx clear of all foreign objects and spontaneously breathing  Level of Consciousness: awake  Oxygen Supplementation: room air    Independent Airway: airway patency satisfactory and stable  Dentition: dentition unchanged  Vital Signs Stable: post-procedure vital signs reviewed and stable  Report to RN Given: handoff report given  Patient transferred to: Phase II  Comments: Refer to RN flowsheet for post-operative vital signs.   Handoff Report: Identifed the Patient, Identified the Reponsible Provider, Reviewed the pertinent medical history, Discussed the surgical course, Reviewed Intra-OP anesthesia mangement and issues during anesthesia, Set expectations for post-procedure period and Allowed opportunity for questions and acknowledgement of understanding  Vitals:  Vitals Value Taken Time   BP     Temp     Pulse     Resp     SpO2         Electronically Signed By: GWENDOLYN Parker CRNA  September 9, 2024  10:45 AM

## 2024-09-09 NOTE — OP NOTE
OPERATIVE REPORT  September 9, 2024      PREOPERATIVE DIAGNOSIS:  Gross Hematuria    POSTOPERATIVE DIAGNOSIS: Same    PROCEDURES PERFORMED:   1. Cystourethroscopy    STAFF SURGEON: Dr Vy PETERSON  RESIDENT(S):  Dutch Alexander MD    ANESTHESIA: Choice    SPECIMENS: none    ESTIMATED BLOOD LOSS: < 10 mL.     DRAINS: None    OPERATIVE INDICATIONS:   Uday Stauffer is a(n) 39 year old female with a history of gross hematuria; her urine cytology was negative and cystoscopy in the office was not possible as she could not tolerate it. The patient elected for cysto under sedation with possible biopsy and fulguration, after a discussion of all risks, benefits, and alternatives.    DESCRIPTION OF PROCEDURE:   After verification of informed consent was obtained, the patient was brought to the operating room, and moved to the operating table. After adequate anesthesia was induced, the patient was repositioned in dorsal lithotomy position with care in neural safety in positioning all four extremities.  She was then prepped and draped in the usual sterile fashion. A formal timeout was performed to confirm the correct patient, procedure and operative site.     A 19-Citizen of [place] rigid cystoscope was inserted into a well lubricated urethra. We began by performing a white light cystourethroscopy. Initially there was some difficulty passing the cystoscope past the midurethra.  With the use of a urojet we were able to passed into the bladder.. There were no urethral masses. The ureteral orifices were in their orthotopic positions bilaterally and both effluxed clear urine. There were no intravesical stones or diverticuli and the bladder was non trabeculated. We noted no bladder masses or suspicious mucosa.  The urethra on removal was examined and noted to be unremarkable.    At this point, the bladder was drained and the cystoscope withdrawn.    The procedure was then concluded. The patient was transferred to the postanesthesia care unit in stable  condition and tolerated the procedure well.    POSTOP PLAN:  1. May discharge home from PACU  2. Appt to discuss after CTU completed    Addendum:    I, Hortensia Mcclellan, was present and scrubbed for the entire case.  I agree with the note as above with changes made as needed.  I was unable to find her family member at the end of the case in the waiting room    Hortensia Mcclellan MD MPH  (she/her/hers)   of Urology  [facility]

## 2024-09-09 NOTE — ANESTHESIA POSTPROCEDURE EVALUATION
Patient: Uday Stauffer    Procedure: Procedure(s):  CYSTOSCOPY       Anesthesia Type:  MAC    Note:  Disposition: Outpatient   Postop Pain Control: Uneventful            Sign Out: Well controlled pain   PONV: No   Neuro/Psych: Uneventful            Sign Out: Acceptable/Baseline neuro status   Airway/Respiratory: Uneventful            Sign Out: Acceptable/Baseline resp. status   CV/Hemodynamics: Uneventful            Sign Out: Acceptable CV status; No obvious hypovolemia; No obvious fluid overload   Other NRE: NONE   DID A NON-ROUTINE EVENT OCCUR?            Last vitals:  Vitals Value Taken Time   BP     Temp     Pulse     Resp     SpO2         Electronically Signed By: Job Keating MD  September 9, 2024  12:17 PM

## 2024-11-13 ENCOUNTER — ANCILLARY PROCEDURE (OUTPATIENT)
Dept: CT IMAGING | Facility: CLINIC | Age: 39
End: 2024-11-13
Attending: PHYSICIAN ASSISTANT
Payer: COMMERCIAL

## 2024-11-13 DIAGNOSIS — R31.0 GROSS HEMATURIA: ICD-10-CM

## 2024-11-13 PROCEDURE — 74178 CT ABD&PLV WO CNTR FLWD CNTR: CPT

## 2024-11-13 PROCEDURE — 250N000009 HC RX 250: Performed by: PHYSICIAN ASSISTANT

## 2024-11-13 PROCEDURE — 250N000011 HC RX IP 250 OP 636: Performed by: PHYSICIAN ASSISTANT

## 2024-11-13 RX ORDER — IOPAMIDOL 755 MG/ML
100 INJECTION, SOLUTION INTRAVASCULAR ONCE
Status: COMPLETED | OUTPATIENT
Start: 2024-11-13 | End: 2024-11-13

## 2024-11-13 RX ADMIN — SODIUM CHLORIDE 120 ML: 9 INJECTION, SOLUTION INTRAVENOUS at 10:13

## 2024-11-13 RX ADMIN — IOPAMIDOL 100 ML: 755 INJECTION, SOLUTION INTRAVENOUS at 10:13

## 2025-04-14 ENCOUNTER — HOSPITAL ENCOUNTER (OUTPATIENT)
Dept: GENERAL RADIOLOGY | Facility: CLINIC | Age: 40
Discharge: HOME | End: 2025-04-14
Attending: INTERNAL MEDICINE
Payer: COMMERCIAL

## 2025-04-14 DIAGNOSIS — R76.12 POSITIVE QUANTIFERON-TB GOLD TEST: ICD-10-CM

## 2025-04-14 PROCEDURE — 999N000028 XR CHEST 1 VIEW, EMPLOYEE HEALTH

## (undated) DEVICE — PREP POVIDONE IODINE SOLUTION 10% 4OZ BOTTLE 29906-004

## (undated) DEVICE — NDL 18GA 1.5" 305196

## (undated) DEVICE — DRSG TELFA 3X8" 1238

## (undated) DEVICE — SOL WATER IRRIG 3000ML BAG 2B7117

## (undated) DEVICE — SOL NACL 0.9% IRRIG 500ML BOTTLE 2F7123

## (undated) DEVICE — SUCTION MANIFOLD NEPTUNE 2 SYS 4 PORT 0702-020-000

## (undated) DEVICE — ESU GROUND PAD ADULT W/CORD E7507

## (undated) DEVICE — PACK CYSTO CUSTOM ASC

## (undated) DEVICE — GLOVE BIOGEL PI MICRO SZ 6.5 48565

## (undated) DEVICE — LINEN TOWEL PACK X5 5464

## (undated) DEVICE — PAD CHUX UNDERPAD 30X36" P3036C

## (undated) DEVICE — PREP POVIDONE-IODINE 7.5% SCRUB 4OZ BOTTLE MDS093945

## (undated) DEVICE — SOL WATER IRRIG 500ML BOTTLE 2F7113

## (undated) RX ORDER — CEFAZOLIN SODIUM 2 G/50ML
SOLUTION INTRAVENOUS
Status: DISPENSED
Start: 2024-09-09

## (undated) RX ORDER — FENTANYL CITRATE 50 UG/ML
INJECTION, SOLUTION INTRAMUSCULAR; INTRAVENOUS
Status: DISPENSED
Start: 2024-09-09

## (undated) RX ORDER — PROPOFOL 10 MG/ML
INJECTION, EMULSION INTRAVENOUS
Status: DISPENSED
Start: 2024-09-09

## (undated) RX ORDER — ONDANSETRON 2 MG/ML
INJECTION INTRAMUSCULAR; INTRAVENOUS
Status: DISPENSED
Start: 2024-09-09

## (undated) RX ORDER — ACETAMINOPHEN 325 MG/1
TABLET ORAL
Status: DISPENSED
Start: 2024-09-09